# Patient Record
Sex: MALE | Race: WHITE | NOT HISPANIC OR LATINO | Employment: OTHER | URBAN - METROPOLITAN AREA
[De-identification: names, ages, dates, MRNs, and addresses within clinical notes are randomized per-mention and may not be internally consistent; named-entity substitution may affect disease eponyms.]

---

## 2017-03-12 ENCOUNTER — HOSPITAL ENCOUNTER (EMERGENCY)
Facility: OTHER | Age: 62
Discharge: ANOTHER HOSPITAL | End: 2017-03-12
Attending: EMERGENCY MEDICINE
Payer: MEDICARE

## 2017-03-12 ENCOUNTER — HOSPITAL ENCOUNTER (INPATIENT)
Facility: HOSPITAL | Age: 62
LOS: 5 days | Discharge: HOME | DRG: 871 | End: 2017-03-17
Attending: HOSPITALIST
Payer: MEDICARE

## 2017-03-12 VITALS
WEIGHT: 145 LBS | RESPIRATION RATE: 18 BRPM | TEMPERATURE: 98 F | SYSTOLIC BLOOD PRESSURE: 150 MMHG | HEIGHT: 65 IN | DIASTOLIC BLOOD PRESSURE: 79 MMHG | OXYGEN SATURATION: 92 % | HEART RATE: 86 BPM | BODY MASS INDEX: 24.16 KG/M2

## 2017-03-12 DIAGNOSIS — A41.9 SEPSIS, DUE TO UNSPECIFIED ORGANISM: ICD-10-CM

## 2017-03-12 DIAGNOSIS — R09.02 HYPOXIA: ICD-10-CM

## 2017-03-12 DIAGNOSIS — J18.9 PNEUMONIA OF RIGHT LOWER LOBE DUE TO INFECTIOUS ORGANISM: Primary | ICD-10-CM

## 2017-03-12 DIAGNOSIS — J15.9 HEALTHCARE ASSOCIATED BACTERIAL PNEUMONIA: ICD-10-CM

## 2017-03-12 PROBLEM — M54.9 CHRONIC BACK PAIN: Chronic | Status: ACTIVE | Noted: 2017-03-12

## 2017-03-12 PROBLEM — E78.5 HYPERLIPIDEMIA: Chronic | Status: ACTIVE | Noted: 2017-03-12

## 2017-03-12 PROBLEM — G89.29 CHRONIC BACK PAIN: Chronic | Status: ACTIVE | Noted: 2017-03-12

## 2017-03-12 PROBLEM — Z72.0 TOBACCO ABUSE: Chronic | Status: ACTIVE | Noted: 2017-03-12

## 2017-03-12 PROBLEM — I10 ESSENTIAL HYPERTENSION: Chronic | Status: ACTIVE | Noted: 2017-03-12

## 2017-03-12 LAB
ALBUMIN SERPL-MCNC: 4 G/DL (ref 3.3–5.5)
ALLENS TEST: ABNORMAL
ALP SERPL-CCNC: 71 U/L (ref 42–141)
BILIRUB SERPL-MCNC: 0.7 MG/DL (ref 0.2–1.6)
BUN SERPL-MCNC: 9 MG/DL (ref 7–22)
CALCIUM SERPL-MCNC: 9.3 MG/DL (ref 8–10.3)
CHLORIDE SERPL-SCNC: 94 MMOL/L (ref 98–108)
CREAT SERPL-MCNC: 0.9 MG/DL (ref 0.6–1.2)
DELSYS: ABNORMAL
GLUCOSE SERPL-MCNC: 127 MG/DL (ref 73–118)
HCO3 UR-SCNC: 23.5 MMOL/L (ref 24–28)
LDH SERPL L TO P-CCNC: 0.8 MMOL/L (ref 0.36–1.25)
PCO2 BLDA: 31.1 MMHG (ref 35–45)
PH SMN: 7.49 [PH] (ref 7.35–7.45)
PO2 BLDA: 46 MMHG (ref 80–100)
POC ALT (SGPT): 18 (ref 10–47)
POC AST (SGOT): 30 (ref 11–38)
POC B-TYPE NATRIURETIC PEPTIDE: 43.7 PG/ML (ref 0–100)
POC BE: 0 MMOL/L
POC CARDIAC TROPONIN I: 0 NG/ML
POC SATURATED O2: 86 % (ref 95–100)
POC TCO2: 24 MMOL/L (ref 23–27)
POC TCO2: 28 (ref 18–33)
POTASSIUM BLD-SCNC: 4 MMOL/L (ref 3.6–5.1)
PROTEIN, POC: 7.2 (ref 6.4–8.1)
SAMPLE: ABNORMAL
SAMPLE: NORMAL
SITE: ABNORMAL
SODIUM BLD-SCNC: 136 MMOL/L (ref 128–145)

## 2017-03-12 PROCEDURE — 84484 ASSAY OF TROPONIN QUANT: CPT

## 2017-03-12 PROCEDURE — 87088 URINE BACTERIA CULTURE: CPT

## 2017-03-12 PROCEDURE — 87077 CULTURE AEROBIC IDENTIFY: CPT

## 2017-03-12 PROCEDURE — 96366 THER/PROPH/DIAG IV INF ADDON: CPT

## 2017-03-12 PROCEDURE — 25000003 PHARM REV CODE 250: Performed by: INTERNAL MEDICINE

## 2017-03-12 PROCEDURE — 11000001 HC ACUTE MED/SURG PRIVATE ROOM

## 2017-03-12 PROCEDURE — 99291 CRITICAL CARE FIRST HOUR: CPT | Mod: 25

## 2017-03-12 PROCEDURE — 25000003 PHARM REV CODE 250: Performed by: EMERGENCY MEDICINE

## 2017-03-12 PROCEDURE — 87205 SMEAR GRAM STAIN: CPT

## 2017-03-12 PROCEDURE — 96367 TX/PROPH/DG ADDL SEQ IV INF: CPT

## 2017-03-12 PROCEDURE — 80053 COMPREHEN METABOLIC PANEL: CPT

## 2017-03-12 PROCEDURE — 87186 SC STD MICRODIL/AGAR DIL: CPT

## 2017-03-12 PROCEDURE — 81003 URINALYSIS AUTO W/O SCOPE: CPT

## 2017-03-12 PROCEDURE — 93010 ELECTROCARDIOGRAM REPORT: CPT | Mod: ,,, | Performed by: INTERNAL MEDICINE

## 2017-03-12 PROCEDURE — 85025 COMPLETE CBC W/AUTO DIFF WBC: CPT

## 2017-03-12 PROCEDURE — 87040 BLOOD CULTURE FOR BACTERIA: CPT

## 2017-03-12 PROCEDURE — 93005 ELECTROCARDIOGRAM TRACING: CPT

## 2017-03-12 PROCEDURE — 87070 CULTURE OTHR SPECIMN AEROBIC: CPT

## 2017-03-12 PROCEDURE — 96365 THER/PROPH/DIAG IV INF INIT: CPT

## 2017-03-12 PROCEDURE — 96361 HYDRATE IV INFUSION ADD-ON: CPT

## 2017-03-12 PROCEDURE — 83880 ASSAY OF NATRIURETIC PEPTIDE: CPT

## 2017-03-12 PROCEDURE — 63600175 PHARM REV CODE 636 W HCPCS: Performed by: EMERGENCY MEDICINE

## 2017-03-12 PROCEDURE — 85610 PROTHROMBIN TIME: CPT

## 2017-03-12 PROCEDURE — 87086 URINE CULTURE/COLONY COUNT: CPT

## 2017-03-12 RX ORDER — RAMELTEON 8 MG/1
8 TABLET ORAL NIGHTLY PRN
Status: DISCONTINUED | OUTPATIENT
Start: 2017-03-12 | End: 2017-03-17 | Stop reason: HOSPADM

## 2017-03-12 RX ORDER — ACETAMINOPHEN 500 MG
1000 TABLET ORAL
Status: COMPLETED | OUTPATIENT
Start: 2017-03-12 | End: 2017-03-12

## 2017-03-12 RX ORDER — ALLOPURINOL 100 MG/1
300 TABLET ORAL DAILY
Status: DISCONTINUED | OUTPATIENT
Start: 2017-03-13 | End: 2017-03-17 | Stop reason: HOSPADM

## 2017-03-12 RX ORDER — ACETAMINOPHEN 500 MG
500 TABLET ORAL EVERY 6 HOURS PRN
Status: DISCONTINUED | OUTPATIENT
Start: 2017-03-12 | End: 2017-03-17 | Stop reason: HOSPADM

## 2017-03-12 RX ORDER — SODIUM CHLORIDE 9 MG/ML
1000 INJECTION, SOLUTION INTRAVENOUS
Status: COMPLETED | OUTPATIENT
Start: 2017-03-12 | End: 2017-03-12

## 2017-03-12 RX ORDER — IPRATROPIUM BROMIDE AND ALBUTEROL SULFATE 2.5; .5 MG/3ML; MG/3ML
3 SOLUTION RESPIRATORY (INHALATION) EVERY 6 HOURS PRN
Status: DISCONTINUED | OUTPATIENT
Start: 2017-03-12 | End: 2017-03-17 | Stop reason: HOSPADM

## 2017-03-12 RX ORDER — ENOXAPARIN SODIUM 100 MG/ML
40 INJECTION SUBCUTANEOUS EVERY 24 HOURS
Status: DISCONTINUED | OUTPATIENT
Start: 2017-03-13 | End: 2017-03-17 | Stop reason: HOSPADM

## 2017-03-12 RX ORDER — SODIUM CHLORIDE 9 MG/ML
INJECTION, SOLUTION INTRAVENOUS CONTINUOUS
Status: ACTIVE | OUTPATIENT
Start: 2017-03-12 | End: 2017-03-13

## 2017-03-12 RX ORDER — CIPROFLOXACIN 2 MG/ML
400 INJECTION, SOLUTION INTRAVENOUS
Status: COMPLETED | OUTPATIENT
Start: 2017-03-12 | End: 2017-03-12

## 2017-03-12 RX ORDER — SULFAMETHOXAZOLE AND TRIMETHOPRIM 400; 80 MG/1; MG/1
1 TABLET ORAL 2 TIMES DAILY
Status: ON HOLD | COMMUNITY
End: 2017-03-15 | Stop reason: HOSPADM

## 2017-03-12 RX ORDER — IBUPROFEN 400 MG/1
800 TABLET ORAL
Status: COMPLETED | OUTPATIENT
Start: 2017-03-12 | End: 2017-03-12

## 2017-03-12 RX ORDER — IBUPROFEN 200 MG
1 TABLET ORAL DAILY
Status: DISCONTINUED | OUTPATIENT
Start: 2017-03-13 | End: 2017-03-17 | Stop reason: HOSPADM

## 2017-03-12 RX ORDER — ATORVASTATIN CALCIUM 10 MG/1
20 TABLET, FILM COATED ORAL DAILY
Status: DISCONTINUED | OUTPATIENT
Start: 2017-03-13 | End: 2017-03-17 | Stop reason: HOSPADM

## 2017-03-12 RX ORDER — CIPROFLOXACIN 2 MG/ML
400 INJECTION, SOLUTION INTRAVENOUS
Status: DISCONTINUED | OUTPATIENT
Start: 2017-03-12 | End: 2017-03-12

## 2017-03-12 RX ORDER — CEFEPIME HYDROCHLORIDE 1 G/50ML
1 INJECTION, SOLUTION INTRAVENOUS
Status: DISCONTINUED | OUTPATIENT
Start: 2017-03-12 | End: 2017-03-12

## 2017-03-12 RX ORDER — CLONIDINE HYDROCHLORIDE 0.1 MG/1
0.1 TABLET ORAL 3 TIMES DAILY PRN
Status: DISCONTINUED | OUTPATIENT
Start: 2017-03-12 | End: 2017-03-17 | Stop reason: HOSPADM

## 2017-03-12 RX ORDER — ONDANSETRON 2 MG/ML
8 INJECTION INTRAMUSCULAR; INTRAVENOUS EVERY 8 HOURS PRN
Status: DISCONTINUED | OUTPATIENT
Start: 2017-03-12 | End: 2017-03-17 | Stop reason: HOSPADM

## 2017-03-12 RX ADMIN — SODIUM CHLORIDE 1000 ML: 0.9 INJECTION, SOLUTION INTRAVENOUS at 07:03

## 2017-03-12 RX ADMIN — SODIUM CHLORIDE: 0.9 INJECTION, SOLUTION INTRAVENOUS at 11:03

## 2017-03-12 RX ADMIN — VANCOMYCIN HYDROCHLORIDE 1 G: 1 INJECTION, POWDER, LYOPHILIZED, FOR SOLUTION INTRAVENOUS at 08:03

## 2017-03-12 RX ADMIN — IBUPROFEN 800 MG: 400 TABLET ORAL at 06:03

## 2017-03-12 RX ADMIN — CIPROFLOXACIN 400 MG: 2 INJECTION, SOLUTION INTRAVENOUS at 06:03

## 2017-03-12 RX ADMIN — SODIUM CHLORIDE, SODIUM LACTATE, POTASSIUM CHLORIDE, AND CALCIUM CHLORIDE 1974 ML: .6; .31; .03; .02 INJECTION, SOLUTION INTRAVENOUS at 04:03

## 2017-03-12 RX ADMIN — ACETAMINOPHEN 1000 MG: 500 TABLET ORAL at 05:03

## 2017-03-12 NOTE — ED NOTES
Patient identifiers verified and correct for Dereck Soria.    LOC: The patient is awake, alert and aware of environment with an appropriate affect, the patient is oriented x 3 and speaking appropriately.  APPEARANCE: Patient resting comfortably and in no acute distress, patient is clean and well groomed, patient's clothing is properly fastened.  SKIN: The skin is warm and dry, color consistent with ethnicity, patient has normal skin turgor and moist mucus membranes, skin intact, recent surgical incision to r groin, no s&s of infection.  MUSCULOSKELETAL: Patient moving all extremities spontaneously, no obvious swelling or deformities noted.  RESPIRATORY: Airway is open and patent, respirations are spontaneous, patient has a normal effort and rate, no accessory muscle use noted, coarse bilateral breath sounds.  CARDIAC: Patient sinus tach, no periphreal edema noted, capillary refill < 3 seconds.  ABDOMEN: Soft and non tender to palpation, no distention noted, normoactive bowel sounds present in all four quadrants.  NEUROLOGIC: PERRL, 3mm bilaterally, eyes open spontaneously, behavior appropriate to situation, follows commands, facial expression symmetrical, bilateral hand grasp equal and even, purposeful motor response noted, normal sensation in all extremities when touched with a finger.

## 2017-03-12 NOTE — ED TRIAGE NOTES
"Onset severa ldays with congestion and back pain / pt reports slight SOB but currently Sat low / pt states while laying flat "It's like I couldn't get any oxygen"   "

## 2017-03-12 NOTE — IP AVS SNAPSHOT
[place] [address] [place] [address]  Phone: [phone]           Patient Discharge Instructions     Our goal is to set you up for success. This packet includes information on your condition, medications, and your home care. It will help you to care for yourself so you don't get sicker and need to go back to the hospital.     Please ask your nurse if you have any questions.        There are many details to remember when preparing to leave the hospital. Here is what you will need to do:    1. Take your medicine. If you are prescribed medications, review your Medication List in the following pages. You may have new medications to  at the pharmacy and others that you'll need to stop taking. Review the instructions for how and when to take your medications. Talk with your doctor or nurses if you are unsure of what to do.     2. Go to your follow-up appointments. Specific follow-up information is listed in the following pages. Your may be contacted by a transition nurse or clinical provider about future appointments. Be sure we have all of the phone numbers to reach you, if needed. Please contact your provider's office if you are unable to make an appointment.     3. Watch for warning signs. Your doctor or nurse will give you detailed warning signs to watch for and when to call for assistance. These instructions may also include educational information about your condition. If you experience any of warning signs to your health, call your doctor.               [facility]  Unless otherwise directed by your provider, please contact [facility], our nurse care line that is available for 24/7 assistance.     [phone] (toll-free)    Registered nurses in the [facility] provide clinical advisement, health education, appointment booking, and other advisory services.                    ** Verify the list of medication(s) below is accurate and up to date.  Carry this with you in case of emergency. If your medications have changed, please notify your healthcare provider.             Medication List      START taking these medications        Additional Info                      amlodipine 10 MG tablet   Commonly known as:  NORVASC   Quantity:  30 tablet   Refills:  4   Dose:  10 mg    Last time this was given:  10 mg on 3/14/2017  8:27 AM   Instructions:  Take 1 tablet (10 mg total) by mouth once daily.     Begin Date    AM    Noon    PM    Bedtime       moxifloxacin 400 mg tablet   Commonly known as:  AVELOX ABC PACK   Quantity:  7 tablet   Refills:  0   Dose:  400 mg    Instructions:  Take 1 tablet (400 mg total) by mouth once daily.     Begin Date    AM    Noon    PM    Bedtime       oxycodone-acetaminophen 7.5-325 mg per tablet   Commonly known as:  PERCOCET   Quantity:  30 tablet   Refills:  0   Dose:  1 tablet   Replaces:  oxycodone-acetaminophen 5-325 mg per tablet    Last time this was given:  1 tablet on 3/14/2017  9:02 AM   Instructions:  Take 1 tablet by mouth every 4 (four) hours as needed.     Begin Date    AM    Noon    PM    Bedtime         CONTINUE taking these medications        Additional Info                      acetaminophen 500 MG tablet   Commonly known as:  TYLENOL   Refills:  0   Dose:  500 mg    Last time this was given:  500 mg on 3/13/2017  8:59 PM   Instructions:  Take 500 mg by mouth every 6 (six) hours as needed.     Begin Date    AM    Noon    PM    Bedtime       allopurinol 300 MG tablet   Commonly known as:  ZYLOPRIM   Refills:  0   Dose:  300 mg    Last time this was given:  300 mg on 3/14/2017  8:27 AM   Instructions:  Take 300 mg by mouth once daily.     Begin Date    AM    Noon    PM    Bedtime       amitriptyline 75 MG tablet   Commonly known as:  ELAVIL   Refills:  0   Dose:  75 mg   Indications:  pt takes 125 mg    Instructions:  Take 75 mg by mouth every evening.     Begin Date    AM    Noon    PM    Bedtime       atorvastatin  20 MG tablet   Commonly known as:  LIPITOR   Refills:  0   Dose:  20 mg    Last time this was given:  20 mg on 3/14/2017  8:27 AM   Instructions:  Take 20 mg by mouth once daily.     Begin Date    AM    Noon    PM    Bedtime       baclofen 10 MG tablet   Commonly known as:  LIORESAL   Refills:  0   Dose:  10 mg    Instructions:  Take 10 mg by mouth 4 (four) times daily.     Begin Date    AM    Noon    PM    Bedtime       dantrolene 25 MG Cap   Commonly known as:  DANTRIUM   Refills:  0   Dose:  100 mg    Instructions:  Take 100 mg by mouth 3 (three) times daily.     Begin Date    AM    Noon    PM    Bedtime       ibuprofen 200 MG tablet   Commonly known as:  ADVIL,MOTRIN   Refills:  0   Dose:  200 mg    Instructions:  Take 200 mg by mouth every 6 (six) hours as needed.     Begin Date    AM    Noon    PM    Bedtime       multivitamin capsule   Refills:  0   Dose:  1 capsule    Instructions:  Take 1 capsule by mouth once daily.     Begin Date    AM    Noon    PM    Bedtime       tizanidine 6 mg capsule   Commonly known as:  ZANAFLEX   Refills:  0   Dose:  4 mg    Instructions:  Take 4 mg by mouth 3 (three) times daily.     Begin Date    AM    Noon    PM    Bedtime         STOP taking these medications     hydrocodone-acetaminophen 5-325mg 5-325 mg per tablet   Commonly known as:  NORCO       oxycodone-acetaminophen 5-325 mg per tablet   Commonly known as:  PERCOCET   Replaced by:  oxycodone-acetaminophen 7.5-325 mg per tablet       sulfamethoxazole-trimethoprim 400-80mg 400-80 mg per tablet   Commonly known as:  BACTRIM,SEPTRA            Where to Get Your Medications      You can get these medications from any pharmacy     Bring a paper prescription for each of these medications     amlodipine 10 MG tablet    moxifloxacin 400 mg tablet    oxycodone-acetaminophen 7.5-325 mg per tablet                  Please bring to all follow up appointments:    1. A copy of your discharge instructions.  2. All medicines you are  "currently taking in their original bottles.  3. Identification and insurance card.    Please arrive 15 minutes ahead of scheduled appointment time.    Please call 24 hours in advance if you must reschedule your appointment and/or time.        Follow-up Information     Follow up with Dusty Chan MD In 1 week.    Specialty:  General Practice    Contact information:    [address] [place] [address]   [address]  [place] [address]  [phone]          Discharge Instructions     Future Orders    Activity as tolerated     Diet general     Questions:    Total calories:      Fat restriction, if any:      Protein restriction, if any:      Na restriction, if any:  2gNa    Fluid restriction:      Additional restrictions:      OXYGEN FOR HOME USE     Questions:    Liter Flow:  2    Duration:  Continuous    Qualifying SpO2:  88% on RA    Testing done at:  [facility]    Route:      Portable mode:      Device:  home concentrator with portable unit    Length of need (in months):      Patient condition with qualifying saturation:      Height:  5' 6" (1.676 m)    Weight:  66 kg (145 lb 7.4 oz)    Does patient have medical equipment at home?:  bath bench    walker, rolling    Alternative treatment measures have been tried or considered and deemed clinically ineffective.:  Yes        Primary Diagnosis     Your primary diagnosis was:  Lung Infection      Admission Information     Date & Time Provider Department CSN    3/12/2017 10:58 PM Garo Oviedo MD [facility] [id number]      Care Providers     Provider Role Specialty Primary office phone    Garo Oviedo MD Attending Provider Hospitalist [phone]      Your Vitals Were     BP Pulse Temp Resp Height Weight    141/69 93 98.3 °F (36.8 °C) (Oral) 18 5' 6" (1.676 m) 66 kg (145 lb 7.4 oz)    SpO2 BMI             96% 23.48 kg/m2         Recent Lab Values     No lab values to display.      Allergies as of 3/15/2017        Reactions    Bee Sting [Allergen " Ext-venom-honey Bee]       Advance Directives     An advance directive is a document which, in the event you are no longer able to make decisions for yourself, tells your healthcare team what kind of treatment you do or do not want to receive, or who you would like to make those decisions for you.  If you do not currently have an advance directive, [facility] encourages you to create one.  For more information call:  [phone]), [phone] ([phone]),  or log on to [email or web address].        Language Assistance Services     ATTENTION: Language assistance services are available, free of charge. Please call [phone].      ATENCIÓN: Si adeline lucas, tiene a whipple disposición servicios gratuitos de asistencia lingüística. Llame al [phone].     CHÚ Ý: N?u b?n nói Ti?ng Vi?t, có các d?ch v? h? tr? ngôn ng? mi?n phí dành cho b?n. G?i s? [phone].        Pneumonmia Discharge Instructions                [facility] Sign-Up     Activating your [facility] account is as easy as 1-2-3!     1) Visit [email or web address], select Sign Up Now, enter this activation code and your date of birth, then select Next.  [email or web address]  Expires: 4/29/2017  8:51 AM      2) Create a username and password to use when you visit [facility] in the future and select a security question in case you lose your password and select Next.    3) Enter your e-mail address and click Sign Up!    Additional Information  If you have questions, please e-mail [email or web address] or call [phone] to talk to our [facility] staff. Remember, [facility] is NOT to be used for urgent needs. For medical emergencies, dial 911.          [facility] complies with applicable Federal civil rights laws and does not discriminate on the basis of race, color, national origin, age, disability, or sex.

## 2017-03-12 NOTE — ED PROVIDER NOTES
Encounter Date: 3/12/2017       History     Chief Complaint   Patient presents with    Back Pain     fever / congestion / cough / + smoker     Review of patient's allergies indicates:   Allergen Reactions    Bee sting [allergen ext-venom-honey bee]      HPI Comments: Dereck Soria is a 61 y.o. male who presents to the Emergency Department with  fever, congestion, cough.  Patient's been having fevers at home.  He has not checked his temperature.  Patient has been having a cough for quite a few days however the fever is over the last 2 days.  Patient reports a history of smoking.  Patient states he also had hernia surgery the beginning of February.  Patient denies abdominal pain, nausea, vomiting.    Patient is a 61 y.o. male presenting with the following complaint: cough. The history is provided by the patient and the spouse.   Cough   This is a new problem. The current episode started several days ago. The cough is productive of sputum. Maximum temperature: Patient has been having fever and chills, but did not take temperature. The fever has been present for 1 to 2 days. Associated symptoms include chills, sweats, rhinorrhea, myalgias, shortness of breath and wheezing. He has tried nothing for the symptoms. He is a smoker. His past medical history does not include COPD or asthma.     Past Medical History:   Diagnosis Date    Back problem     Hyperlipidemia     Hypertension      Past Surgical History:   Procedure Laterality Date    BACK SURGERY      CARPAL TUNNEL RELEASE Left     CERVICAL FUSION      x2    KNEE SURGERY  left tibial osteotomy as child    SPINAL FUSION       Family History   Problem Relation Age of Onset    Diabetes Mother      Social History   Substance Use Topics    Smoking status: Current Every Day Smoker    Smokeless tobacco: None    Alcohol use 4.2 oz/week     7 Shots of liquor per week     Review of Systems   Constitutional: Positive for chills.   HENT: Positive for  rhinorrhea.    Respiratory: Positive for cough, shortness of breath and wheezing.    Gastrointestinal: Negative for abdominal pain, nausea and vomiting.   Musculoskeletal: Positive for myalgias.   All other systems reviewed and are negative.      Physical Exam   Initial Vitals   BP Pulse Resp Temp SpO2   03/12/17 1613 03/12/17 1613 03/12/17 1613 03/12/17 1613 03/12/17 1613   154/76 125 22 100.3 °F (37.9 °C) 88 %     Physical Exam    Nursing note and vitals reviewed.  Constitutional: He appears well-developed and well-nourished. He is diaphoretic. He appears distressed.   HENT:   Head: Normocephalic and atraumatic.   Right Ear: External ear normal.   Left Ear: External ear normal.   Nose: Nose normal.   Mouth/Throat: Oropharynx is clear and moist.   Eyes: EOM are normal. Pupils are equal, round, and reactive to light.   Neck: Normal range of motion. Neck supple. No tracheal deviation present. No JVD present.   Cardiovascular: Regular rhythm, S1 normal, S2 normal, normal heart sounds and intact distal pulses. Tachycardia present.  Exam reveals no gallop and no friction rub.    No murmur heard.  Pulmonary/Chest: Tachypnea noted. He is in respiratory distress. He has decreased breath sounds in the right lower field and the left lower field. He has rhonchi in the right lower field. He has no rales. He exhibits no tenderness.   Abdominal: Soft. Bowel sounds are normal. He exhibits no distension. There is no tenderness. There is no rebound and no guarding.   Musculoskeletal: Normal range of motion. He exhibits no edema or tenderness.   Neurological: He is alert and oriented to person, place, and time. He has normal strength and normal reflexes. He displays normal reflexes. No cranial nerve deficit or sensory deficit.   Skin: Skin is warm.   Psychiatric: He has a normal mood and affect.         ED Course   Critical Care  Date/Time: 3/12/2017 6:05 PM  Performed by: MICHAEL ANGELES  Authorized by: MICHAEL ANGELES   Direct patient  critical care time: 12 minutes  Additional history critical care time: 10 minutes  Ordering / reviewing critical care time: 15 minutes  Documentation critical care time: 11 minutes  Consulting other physicians critical care time: 10 minutes  Consult with family critical care time: 8 minutes  Total critical care time (exclusive of procedural time) : 66 minutes  Critical care was time spent personally by me on the following activities: pulse oximetry, re-evaluation of patient's condition, ordering and review of radiographic studies, ordering and review of laboratory studies, examination of patient and evaluation of patient's response to treatment.        Labs Reviewed   ISTAT PROCEDURE - Abnormal; Notable for the following:        Result Value    POC PH 7.486 (*)     POC PCO2 31.1 (*)     POC PO2 46 (*)     POC HCO3 23.5 (*)     POC SATURATED O2 86 (*)     All other components within normal limits   CULTURE, BLOOD    Narrative:     Aerobic and anaerobic   CULTURE, BLOOD    Narrative:     Aerobic and anaerobic   CULTURE, RESPIRATORY   TROPONIN ISTAT   POCT CBC   POCT URINALYSIS DIPSTICK (CULTURE IF INDICATED)   POCT CMP   POCT PROTIME-INR   POCT TROPONIN   POCT B-TYPE NATRIURETIC PEPTIDE (BNP)   POCT B-TYPE NATRIURETIC PEPTIDE (BNP)   POCT CMP     EKG Readings: (Independently Interpreted)   Initial Reading: No STEMI. Rhythm: Sinus Tachycardia. Heart Rate: 110. Conduction: RBBB. Axis: Right Axis Deviation. Clinical Impression: Sinus Tachycardia Other Impression: Abnormal EKG right axis.       Troponin is negative at 0.00  ABG with low PO2 of 46.  CMP sodium 136, potassium 4.0, glucose 127, BUN 9, creatinine 0.9.  CBC White blood cell count 11.3, hemoglobin 12.8, hematocrit 37.1, and platelets 138.  BNP 43.7.    Admission on 03/12/2017   Component Date Value Ref Range Status    POC PH 03/12/2017 7.486* 7.35 - 7.45 Final    POC PCO2 03/12/2017 31.1* 35 - 45 mmHg Final    POC PO2 03/12/2017 46* 80 - 100 mmHg Final     POC HCO3 03/12/2017 23.5* 24 - 28 mmol/L Final    POC BE 03/12/2017 0  -2 to 2 mmol/L Final    POC SATURATED O2 03/12/2017 86* 95 - 100 % Final    POC Lactate 03/12/2017 0.80  0.36 - 1.25 mmol/L Final    POC TCO2 03/12/2017 24  23 - 27 mmol/L Final    Sample 03/12/2017 ARTERIAL   Final    Site 03/12/2017 RR   Final    Allens Test 03/12/2017 Pass   Final    DelSys 03/12/2017 Room Air   Final    POC B-Type Natriuretic Peptide 03/12/2017 43.7  0.0 - 100 pg/mL Final    Albumin, POC 03/12/2017 4.0  3.3 - 5.5 Final    Alkaline Phosphatase, POC 03/12/2017 71  42 - 141 Final    ALT (SGPT), POC 03/12/2017 18  10.0 - 47.0 Final    AST (SGOT), POC 03/12/2017 30  11.0 - 38 Final    POC BUN 03/12/2017 9  7.0 - 22.0 Final    Calcium, POC 03/12/2017 9.3  8.0 - 10.3 Final    POC Chloride 03/12/2017 94  98 - 108 Final    POC Creatinine 03/12/2017 0.9  0.6 - 1.2 Final    POC Glucose 03/12/2017 127  73 - 118 Final    POC Potassium 03/12/2017 4.0  3.6 - 5.1 Final    POC Sodium 03/12/2017 136  128 - 145 Final    Bilirubin 03/12/2017 0.7  0.2 - 1.6 Final    POC TCO2 03/12/2017 28  18 - 33 Final    Protein 03/12/2017 7.2  6.4 - 8.1 Final    POC Cardiac Troponin I 03/12/2017 0.00  <0.09 ng/mL Final    Sample 03/12/2017 UNK   Final       Imaging Results         X-Ray Chest AP Portable (Final result) Result time:  03/12/17 16:48:15    Final result by Interface, Rad Results In (03/12/17 16:48:15)    Narrative:    Study Desc:   XR CHEST AP PORTABLE  Clinical History: Sepsis with hypoxia     Comparison: None     Technique: Single frontal view of the chest was obtained.     Findings:  Heart: The cardiac silhouette is normal in size.  The aorta is unremarkable.  The   pulmonary vasculature is normal in caliber.     Lungs: Lung volumes are maintained.  The left lung is clear.  There are no effusions or   pneumothoraces.  There is hazy opacity in the right lung base.     Bones: The visualized osseous structures are  unremarkable.     Impression:  Right lower lobe infiltrate.     SL: 24 Signed by: Noa Davalos MD  2017-03-12 16:48:14                                   ED Course       CC productive cough  DDx influenza, sepsis, viral illness, bronchitis, and pneumonia  Treatment in the ED Tylenol for fever, IV fluids based on sepsis protocol IV antibiotics.  Oxygen to treat hypoxia  Contacted transfer center at 6 PM spoke with Jeff regarding admission at off [facility].  Consultation with Dr Jacob for admission.  Discussed patient's presentation, past medical history, physical exam, and ED course.  Also discussed vital signs and diagnosis is.  At this time patient will be admitted to Dr Giovanni Ko inpatient for hCAP.  Patient is agreeable to admission.      Clinical Impression:   The primary encounter diagnosis was Pneumonia of right lower lobe due to infectious organism. Diagnoses of Sepsis, due to unspecified organism and Hypoxia were also pertinent to this visit.          Sally Green DO  03/12/17 1918       Sally Green DO  03/12/17 1942

## 2017-03-13 LAB
ALBUMIN SERPL BCP-MCNC: 3.2 G/DL
ALP SERPL-CCNC: 80 U/L
ALT SERPL W/O P-5'-P-CCNC: 10 U/L
ANION GAP SERPL CALC-SCNC: 8 MMOL/L
AST SERPL-CCNC: 11 U/L
BASOPHILS # BLD AUTO: 0.02 K/UL
BASOPHILS NFR BLD: 0.2 %
BILIRUB SERPL-MCNC: 0.7 MG/DL
BUN SERPL-MCNC: 9 MG/DL
CALCIUM SERPL-MCNC: 8.8 MG/DL
CHLORIDE SERPL-SCNC: 103 MMOL/L
CO2 SERPL-SCNC: 26 MMOL/L
CREAT SERPL-MCNC: 0.8 MG/DL
DIFFERENTIAL METHOD: ABNORMAL
EOSINOPHIL # BLD AUTO: 0 K/UL
EOSINOPHIL NFR BLD: 0.3 %
ERYTHROCYTE [DISTWIDTH] IN BLOOD BY AUTOMATED COUNT: 12.6 %
EST. GFR  (AFRICAN AMERICAN): >60 ML/MIN/1.73 M^2
EST. GFR  (NON AFRICAN AMERICAN): >60 ML/MIN/1.73 M^2
GLUCOSE SERPL-MCNC: 130 MG/DL
HCT VFR BLD AUTO: 33 %
HGB BLD-MCNC: 11.2 G/DL
LYMPHOCYTES # BLD AUTO: 1 K/UL
LYMPHOCYTES NFR BLD: 9.6 %
MAGNESIUM SERPL-MCNC: 1.8 MG/DL
MCH RBC QN AUTO: 36.4 PG
MCHC RBC AUTO-ENTMCNC: 33.9 %
MCV RBC AUTO: 107 FL
MONOCYTES # BLD AUTO: 1.1 K/UL
MONOCYTES NFR BLD: 10.6 %
NEUTROPHILS # BLD AUTO: 8.1 K/UL
NEUTROPHILS NFR BLD: 79 %
PHOSPHATE SERPL-MCNC: 2.7 MG/DL
PLATELET # BLD AUTO: 135 K/UL
PMV BLD AUTO: 11 FL
POTASSIUM SERPL-SCNC: 3.5 MMOL/L
PROT SERPL-MCNC: 6.2 G/DL
RBC # BLD AUTO: 3.08 M/UL
SODIUM SERPL-SCNC: 137 MMOL/L
VANCOMYCIN TROUGH SERPL-MCNC: 11.6 UG/ML
WBC # BLD AUTO: 10.21 K/UL

## 2017-03-13 PROCEDURE — 36415 COLL VENOUS BLD VENIPUNCTURE: CPT

## 2017-03-13 PROCEDURE — 83735 ASSAY OF MAGNESIUM: CPT

## 2017-03-13 PROCEDURE — 80202 ASSAY OF VANCOMYCIN: CPT

## 2017-03-13 PROCEDURE — 25000003 PHARM REV CODE 250: Performed by: INTERNAL MEDICINE

## 2017-03-13 PROCEDURE — 63600175 PHARM REV CODE 636 W HCPCS: Performed by: INTERNAL MEDICINE

## 2017-03-13 PROCEDURE — 80053 COMPREHEN METABOLIC PANEL: CPT

## 2017-03-13 PROCEDURE — 11000001 HC ACUTE MED/SURG PRIVATE ROOM

## 2017-03-13 PROCEDURE — 84100 ASSAY OF PHOSPHORUS: CPT

## 2017-03-13 PROCEDURE — 85025 COMPLETE CBC W/AUTO DIFF WBC: CPT

## 2017-03-13 RX ORDER — AMLODIPINE BESYLATE 5 MG/1
10 TABLET ORAL DAILY
Status: DISCONTINUED | OUTPATIENT
Start: 2017-03-13 | End: 2017-03-17 | Stop reason: HOSPADM

## 2017-03-13 RX ORDER — VALSARTAN 80 MG/1
320 TABLET ORAL DAILY
Status: DISCONTINUED | OUTPATIENT
Start: 2017-03-14 | End: 2017-03-17 | Stop reason: HOSPADM

## 2017-03-13 RX ORDER — KETOROLAC TROMETHAMINE 30 MG/ML
30 INJECTION, SOLUTION INTRAMUSCULAR; INTRAVENOUS ONCE
Status: COMPLETED | OUTPATIENT
Start: 2017-03-13 | End: 2017-03-13

## 2017-03-13 RX ORDER — MORPHINE SULFATE 10 MG/ML
2 INJECTION INTRAMUSCULAR; INTRAVENOUS; SUBCUTANEOUS ONCE
Status: COMPLETED | OUTPATIENT
Start: 2017-03-13 | End: 2017-03-13

## 2017-03-13 RX ADMIN — NICOTINE 1 PATCH: 21 PATCH, EXTENDED RELEASE TRANSDERMAL at 08:03

## 2017-03-13 RX ADMIN — KETOROLAC TROMETHAMINE 30 MG: 30 INJECTION, SOLUTION INTRAMUSCULAR at 09:03

## 2017-03-13 RX ADMIN — ENOXAPARIN SODIUM 40 MG: 100 INJECTION SUBCUTANEOUS at 12:03

## 2017-03-13 RX ADMIN — ACETAMINOPHEN 500 MG: 500 TABLET ORAL at 08:03

## 2017-03-13 RX ADMIN — CLONIDINE HYDROCHLORIDE 0.1 MG: 0.1 TABLET ORAL at 04:03

## 2017-03-13 RX ADMIN — MORPHINE SULFATE 2 MG: 10 INJECTION INTRAVENOUS at 09:03

## 2017-03-13 RX ADMIN — AMLODIPINE BESYLATE 10 MG: 5 TABLET ORAL at 09:03

## 2017-03-13 RX ADMIN — ATORVASTATIN CALCIUM 20 MG: 10 TABLET, FILM COATED ORAL at 08:03

## 2017-03-13 RX ADMIN — TIZANIDINE 6 MG: 2 TABLET ORAL at 08:03

## 2017-03-13 RX ADMIN — AZITHROMYCIN MONOHYDRATE 500 MG: 500 INJECTION, POWDER, LYOPHILIZED, FOR SOLUTION INTRAVENOUS at 08:03

## 2017-03-13 RX ADMIN — CEFTRIAXONE 1 G: 1 INJECTION, SOLUTION INTRAVENOUS at 08:03

## 2017-03-13 RX ADMIN — ACETAMINOPHEN 500 MG: 500 TABLET ORAL at 04:03

## 2017-03-13 RX ADMIN — SODIUM CHLORIDE: 0.9 INJECTION, SOLUTION INTRAVENOUS at 09:03

## 2017-03-13 RX ADMIN — ALLOPURINOL 300 MG: 100 TABLET ORAL at 08:03

## 2017-03-13 NOTE — SUBJECTIVE & OBJECTIVE
Past Medical History:   Diagnosis Date    Back problem     Hyperlipidemia     Hypertension        Past Surgical History:   Procedure Laterality Date    BACK SURGERY      CARPAL TUNNEL RELEASE Left     CERVICAL FUSION      x2    KNEE SURGERY  left tibial osteotomy as child    SPINAL FUSION         Review of patient's allergies indicates:   Allergen Reactions    Bee sting [allergen ext-venom-honey bee]        Current Facility-Administered Medications on File Prior to Encounter   Medication    [COMPLETED] 0.9%  NaCl infusion    [COMPLETED] acetaminophen tablet 1,000 mg    [COMPLETED] ciprofloxacin (CIPRO)400mg/200ml D5W IVPB 400 mg    [COMPLETED] ibuprofen tablet 800 mg    [COMPLETED] lactated ringers bolus 1,974 mL    [COMPLETED] vancomycin 1 g in dextrose 5 % 250 mL IVPB (ready to mix system)    [DISCONTINUED] cefepime in dextrose 5 % 1 gram/50 mL IVPB 1 g     Current Outpatient Prescriptions on File Prior to Encounter   Medication Sig    acetaminophen (TYLENOL) 500 MG tablet Take 500 mg by mouth every 6 (six) hours as needed.    allopurinol (ZYLOPRIM) 300 MG tablet Take 300 mg by mouth once daily.    amitriptyline (ELAVIL) 75 MG tablet Take 75 mg by mouth every evening.    atorvastatin (LIPITOR) 20 MG tablet Take 20 mg by mouth once daily.    baclofen (LIORESAL) 10 MG tablet Take 10 mg by mouth 4 (four) times daily.    dantrolene (DANTRIUM) 25 MG Cap Take 100 mg by mouth 3 (three) times daily.    hydrocodone-acetaminophen 5-325mg (NORCO) 5-325 mg per tablet Take 1 tablet by mouth every 6 (six) hours as needed for Pain.    ibuprofen (ADVIL,MOTRIN) 200 MG tablet Take 200 mg by mouth every 6 (six) hours as needed.    multivitamin capsule Take 1 capsule by mouth once daily.    oxycodone-acetaminophen (PERCOCET) 5-325 mg per tablet Take 1 tablet by mouth every 6 (six) hours as needed for Pain.    sulfamethoxazole-trimethoprim 400-80mg (BACTRIM,SEPTRA) 400-80 mg per tablet Take 1 tablet by mouth  2 (two) times daily.    tizanidine (ZANAFLEX) 6 mg capsule Take 4 mg by mouth 3 (three) times daily.      Family History     Problem Relation (Age of Onset)    Diabetes Mother        Social History Main Topics    Smoking status: Current Every Day Smoker    Smokeless tobacco: Not on file    Alcohol use 4.2 oz/week     7 Shots of liquor per week    Drug use: No    Sexual activity: Not on file     Review of Systems   Constitutional: Positive for fever. Negative for activity change, appetite change, chills, diaphoresis, fatigue and unexpected weight change.   HENT: Negative.    Eyes: Negative.    Respiratory: Positive for cough. Negative for chest tightness, shortness of breath and wheezing.    Cardiovascular: Negative for chest pain, palpitations and leg swelling.   Gastrointestinal: Negative for abdominal distention, abdominal pain, blood in stool, constipation, diarrhea, nausea and vomiting.   Endocrine: Negative.    Genitourinary: Negative for dysuria and hematuria.   Musculoskeletal: Negative.    Neurological: Negative for dizziness, seizures, syncope, weakness and light-headedness.   Psychiatric/Behavioral: Negative.      Objective:     Vital Signs (Most Recent):  Temp: 98.6 °F (37 °C) (03/12/17 2322)  Pulse: 82 (03/12/17 2322)  Resp: 18 (03/12/17 2322)  BP: 130/72 (03/12/17 2322)  SpO2: (!) 94 % (03/12/17 2322) Vital Signs (24h Range):  Temp:  [98 °F (36.7 °C)-102.4 °F (39.1 °C)] 98.6 °F (37 °C)  Pulse:  [] 82  Resp:  [18-22] 18  SpO2:  [88 %-95 %] 94 %  BP: (130-154)/(72-92) 130/72     Weight: 65.8 kg (145 lb)  Body mass index is 23.4 kg/(m^2).    Physical Exam   Constitutional: He is oriented to person, place, and time. He appears well-developed and well-nourished. No distress.   HENT:   Head: Normocephalic and atraumatic.   Right Ear: External ear normal.   Left Ear: External ear normal.   Nose: Nose normal.   Eyes: Right eye exhibits no discharge. Left eye exhibits no discharge.   Neck: Normal  range of motion.   Cardiovascular:   Normal rate and rhythm with an positive S4   Pulmonary/Chest: Effort normal and breath sounds normal. No respiratory distress. He has no wheezes. He has no rales. He exhibits no tenderness.   Abdominal: Soft. Bowel sounds are normal. He exhibits no distension. There is no tenderness. There is no rebound and no guarding.   Musculoskeletal: Normal range of motion. He exhibits no edema.   Neurological: He is alert and oriented to person, place, and time.   Skin: Skin is warm and dry. He is not diaphoretic. No erythema.   Psychiatric: He has a normal mood and affect. His behavior is normal. Judgment and thought content normal.   Nursing note and vitals reviewed.       Significant Labs: All pertinent labs within the past 24 hours have been reviewed.    Significant Imaging: I have reviewed and interpreted all pertinent imaging results/findings within the past 24 hours.

## 2017-03-13 NOTE — ASSESSMENT & PLAN NOTE
The patient has a CURB-65 score of 0, and does not meet criteria for healthcare-associated pneumonia.  I have reviewed the chest X-ray and it reveals a right lower lobe infiltrate that given his presentation is suspicious for infection.  Oxygen saturations are stable.  Blood and sputum cultures are pending.  Will start empiric antibiotic therapy.

## 2017-03-13 NOTE — PLAN OF CARE
03/13/17 0850   Discharge Assessment   Assessment Type Discharge Planning Assessment   Confirmed/corrected address and phone number on facesheet? Yes   Assessment information obtained from? Patient   Type of Healthcare Directive Received Living will  (Pt has a will, [facility] does not have a copy. )   Prior to hospitilization cognitive status: Alert/Oriented   Prior to hospitalization functional status: Assistive Equipment   Current cognitive status: Alert/Oriented   Current Functional Status: Assistive Equipment   Arrived From admitted as an inpatient   Lives With spouse   Able to Return to Prior Arrangements yes   Is patient able to care for self after discharge? Yes   How many people do you have in your home that can help with your care after discharge? 1   Who are your caregiver(s) and their phone number(s)? janice- [phone]   Patient's perception of discharge disposition admitted as an inpatient   Readmission Within The Last 30 Days no previous admission in last 30 days   Patient currently being followed by outpatient case management? No   Patient currently receives home health services? No   Does the patient currently use HME? Yes   Patient currently receives private duty nursing? No   Patient currently receives any other outside agency services? No   Equipment Currently Used at Home bath bench;walker, rolling   Do you have any problems affording any of your prescribed medications? No   Is the patient taking medications as prescribed? yes   Do you have any financial concerns preventing you from receiving the healthcare you need? No   Does the patient have transportation to healthcare appointments? Yes   Transportation Available car   On Dialysis? No   Does the patient receive services at the [facility]? No   Are there any open cases? No   Discharge Plan A Home with family   Discharge Plan B Home with family   Patient/Family In Agreement With Plan yes     Pt information verified. SW explained the role  of CM and provided pt with SW contact information.

## 2017-03-13 NOTE — H&P
"[facility] Hospital Medicine  History & Physical    Patient Name: Dereck Soria  MRN: [id number]  Admission Date: 3/12/2017  Attending Physician: Liyah Gross MD   Primary Care Provider: Dusty Chan MD         Patient information was obtained from patient.     Subjective:     Principal Problem:Community acquired bacterial pneumonia    Chief Complaint: Coughing and fever for two days.       HPI: Mr. Dereck Soria is a 61 y.o. male with essential hypertension, chronic back pain, and tobacco abuse who presents to [facility] ED with complaints of cough for one week.  He reports that the cough has become productive in the last couple days with yellow-green sputum, and today his wife noticed that he was warm to touch.  He says that the cough can get so bad at times that he has some right-sided "rib pain".  He has not been short of breath nor has he had any wheezing.  He denies any nausea, vomiting, anterior chest pain, palpitations, diaphoresis, night sweats, or unexplained weight loss.  There has not been any recent travel but he thinks his grand children has been sick.  He has had both his influenza and pneumococcal vaccinations this year.      Chart Review:  Previous Hospitalizations  Date Hospital Diagnosis   Apr 2015 [facility] Right hand carpal tunnel release    Mar 2015 [facility] Left hand carpal tunnel release      Outpatient Follow-Up  Date of Visit Physician Service   Dec 2013 Aide Linda MD Sports Medicine     Past Medical History:   Diagnosis Date    Back problem     Hyperlipidemia     Hypertension        Past Surgical History:   Procedure Laterality Date    BACK SURGERY      CARPAL TUNNEL RELEASE Left     CERVICAL FUSION      x2    KNEE SURGERY  left tibial osteotomy as child    SPINAL FUSION         Review of patient's allergies indicates:   Allergen Reactions    Bee sting [allergen ext-venom-honey bee]        Current Facility-Administered Medications on File Prior " to Encounter   Medication    [COMPLETED] 0.9%  NaCl infusion    [COMPLETED] acetaminophen tablet 1,000 mg    [COMPLETED] ciprofloxacin (CIPRO)400mg/200ml D5W IVPB 400 mg    [COMPLETED] ibuprofen tablet 800 mg    [COMPLETED] lactated ringers bolus 1,974 mL    [COMPLETED] vancomycin 1 g in dextrose 5 % 250 mL IVPB (ready to mix system)    [DISCONTINUED] cefepime in dextrose 5 % 1 gram/50 mL IVPB 1 g     Current Outpatient Prescriptions on File Prior to Encounter   Medication Sig    acetaminophen (TYLENOL) 500 MG tablet Take 500 mg by mouth every 6 (six) hours as needed.    allopurinol (ZYLOPRIM) 300 MG tablet Take 300 mg by mouth once daily.    amitriptyline (ELAVIL) 75 MG tablet Take 75 mg by mouth every evening.    atorvastatin (LIPITOR) 20 MG tablet Take 20 mg by mouth once daily.    baclofen (LIORESAL) 10 MG tablet Take 10 mg by mouth 4 (four) times daily.    dantrolene (DANTRIUM) 25 MG Cap Take 100 mg by mouth 3 (three) times daily.    hydrocodone-acetaminophen 5-325mg (NORCO) 5-325 mg per tablet Take 1 tablet by mouth every 6 (six) hours as needed for Pain.    ibuprofen (ADVIL,MOTRIN) 200 MG tablet Take 200 mg by mouth every 6 (six) hours as needed.    multivitamin capsule Take 1 capsule by mouth once daily.    oxycodone-acetaminophen (PERCOCET) 5-325 mg per tablet Take 1 tablet by mouth every 6 (six) hours as needed for Pain.    sulfamethoxazole-trimethoprim 400-80mg (BACTRIM,SEPTRA) 400-80 mg per tablet Take 1 tablet by mouth 2 (two) times daily.    tizanidine (ZANAFLEX) 6 mg capsule Take 4 mg by mouth 3 (three) times daily.      Family History     Problem Relation (Age of Onset)    Diabetes Mother        Social History Main Topics    Smoking status: Current Every Day Smoker    Smokeless tobacco: Not on file    Alcohol use 4.2 oz/week     7 Shots of liquor per week    Drug use: No    Sexual activity: Not on file     Review of Systems   Constitutional: Positive for fever. Negative for  activity change, appetite change, chills, diaphoresis, fatigue and unexpected weight change.   HENT: Negative.    Eyes: Negative.    Respiratory: Positive for cough. Negative for chest tightness, shortness of breath and wheezing.    Cardiovascular: Negative for chest pain, palpitations and leg swelling.   Gastrointestinal: Negative for abdominal distention, abdominal pain, blood in stool, constipation, diarrhea, nausea and vomiting.   Endocrine: Negative.    Genitourinary: Negative for dysuria and hematuria.   Musculoskeletal: Negative.    Neurological: Negative for dizziness, seizures, syncope, weakness and light-headedness.   Psychiatric/Behavioral: Negative.      Objective:     Vital Signs (Most Recent):  Temp: 98.6 °F (37 °C) (03/12/17 2322)  Pulse: 82 (03/12/17 2322)  Resp: 18 (03/12/17 2322)  BP: 130/72 (03/12/17 2322)  SpO2: (!) 94 % (03/12/17 2322) Vital Signs (24h Range):  Temp:  [98 °F (36.7 °C)-102.4 °F (39.1 °C)] 98.6 °F (37 °C)  Pulse:  [] 82  Resp:  [18-22] 18  SpO2:  [88 %-95 %] 94 %  BP: (130-154)/(72-92) 130/72     Weight: 65.8 kg (145 lb)  Body mass index is 23.4 kg/(m^2).    Physical Exam   Constitutional: He is oriented to person, place, and time. He appears well-developed and well-nourished. No distress.   HENT:   Head: Normocephalic and atraumatic.   Right Ear: External ear normal.   Left Ear: External ear normal.   Nose: Nose normal.   Eyes: Right eye exhibits no discharge. Left eye exhibits no discharge.   Neck: Normal range of motion.   Cardiovascular:   Normal rate and rhythm with an positive S4   Pulmonary/Chest: Effort normal and breath sounds normal. No respiratory distress. He has no wheezes. He has no rales. He exhibits no tenderness.   Abdominal: Soft. Bowel sounds are normal. He exhibits no distension. There is no tenderness. There is no rebound and no guarding.   Musculoskeletal: Normal range of motion. He exhibits no edema.   Neurological: He is alert and oriented to person,  place, and time.   Skin: Skin is warm and dry. He is not diaphoretic. No erythema.   Psychiatric: He has a normal mood and affect. His behavior is normal. Judgment and thought content normal.   Nursing note and vitals reviewed.       Significant Labs: All pertinent labs within the past 24 hours have been reviewed.    Significant Imaging: I have reviewed and interpreted all pertinent imaging results/findings within the past 24 hours.    Assessment/Plan:     * Community acquired bacterial pneumonia  The patient has a CURB-65 score of 0, and does not meet criteria for healthcare-associated pneumonia.  I have reviewed the chest X-ray and it reveals a right lower lobe infiltrate that given his presentation is suspicious for infection.  Oxygen saturations are stable.  Blood and sputum cultures are pending.  Will start empiric antibiotic therapy.    Sepsis  This patient meets criteria for sepsis given fever, tachycardia, tachypnea, and pneumonia; there is no evidence of end-organ dysfunction.  Blood cultures are pending; will start IV fluid hydration and broad spectrum antibiotics.    Essential hypertension  Poorly-controlled not on medications at this time.  Will provide as-needed clonidine.    Chronic back pain  Stable; there are no acute issues.    Tobacco abuse  Patient was counseled on smoking cessation and he will be provided a nicotine transdermal patch applied while inpatient.  Will provide additional smoking cessation counseling prior to discharge.    Hyperlipidemia  Will continue his home regimen of atorvastatin.    VTE Risk Mitigation         Ordered     enoxaparin injection 40 mg  Daily     Route:  Subcutaneous        03/12/17 2310     Medium Risk of VTE  Once      03/12/17 2310            Total time spent on case: 45 minutes.        Kole Jacob M.D.  Staff Nocturnist  Department of [facility] Medicine  [facility]  Pager: [phone]

## 2017-03-13 NOTE — ED NOTES
Report taken from DOLLY Hoskins reports pt here for back pain, chest xrays show RLL pneumonia, pt with yellow thick sputum, BC x 2 and sputum cultures pending, Vancomycin outstanding

## 2017-03-13 NOTE — PROGRESS NOTES
"[facility] Medicine  Progress Note    Patient Name: Dereck Soria  MRN: [id number]  Patient Class: IP- Inpatient   Admission Date: 3/12/2017  Length of Stay: 1 days  Attending Physician: Garo Oviedo MD  Primary Care Provider: Dusty Chan MD        Subjective:     Principal Problem:Community acquired bacterial pneumonia    HPI:  Mr. Dereck Soria is a 61 y.o. male with essential hypertension, chronic back pain, and tobacco abuse who presents to [facility] ED with complaints of cough for one week.  He reports that the cough has become productive in the last couple days with yellow-green sputum, and today his wife noticed that he was warm to touch.  He says that the cough can get so bad at times that he has some right-sided "rib pain".  He has not been short of breath nor has he had any wheezing.  He denies any nausea, vomiting, anterior chest pain, palpitations, diaphoresis, night sweats, or unexplained weight loss.  There has not been any recent travel but he thinks his grand children has been sick.  He has had both his influenza and pneumococcal vaccinations this year.      Hospital Course:  The patient was admitted to the hospital for treatment of CAP.      Interval History: complains of back pain with coughing.     Review of Systems   Constitutional: Negative for activity change.   HENT: Positive for congestion.    Respiratory: Negative for chest tightness and shortness of breath.    Cardiovascular: Negative for chest pain.   Gastrointestinal: Negative for abdominal pain.   Musculoskeletal: Positive for back pain.     Objective:     Vital Signs (Most Recent):  Temp: 100 °F (37.8 °C) (03/13/17 0712)  Pulse: 93 (03/13/17 0712)  Resp: 18 (03/13/17 0712)  BP: (!) 172/86 (03/13/17 0712)  SpO2: (!) 94 % (03/13/17 0712) Vital Signs (24h Range):  Temp:  [98 °F (36.7 °C)-102.4 °F (39.1 °C)] 100 °F (37.8 °C)  Pulse:  [] 93  Resp:  [18-22] 18  SpO2:  [88 %-95 %] 94 %  BP: " (130-172)/(72-92) 172/86     Weight: 65.8 kg (145 lb)  Body mass index is 23.4 kg/(m^2).    Intake/Output Summary (Last 24 hours) at 03/13/17 0842  Last data filed at 03/13/17 0600   Gross per 24 hour   Intake             1140 ml   Output             1150 ml   Net              -10 ml      Physical Exam   Constitutional: He is oriented to person, place, and time. He appears well-developed and well-nourished.   HENT:   Head: Normocephalic and atraumatic.   Cardiovascular: Normal rate.    Pulmonary/Chest: Effort normal.   Neurological: He is alert and oriented to person, place, and time.   Vitals reviewed.      Significant Labs:   BMP:   Recent Labs  Lab 03/13/17  0546   *      K 3.5      CO2 26   BUN 9   CREATININE 0.8   CALCIUM 8.8   MG 1.8     CBC:   Recent Labs  Lab 03/13/17  0546   WBC 10.21   HGB 11.2*   HCT 33.0*   *       Significant Imagin    Assessment/Plan:      * Community acquired bacterial pneumonia  The patient has a CURB-65 score of 0, and does not meet criteria for healthcare-associated pneumonia.  I have reviewed the chest X-ray and it reveals a right lower lobe infiltrate that given his presentation is suspicious for infection.  Oxygen saturations are stable.  Blood and sputum cultures are pending.  Will start empiric antibiotic therapy.    Sepsis  This patient meets criteria for sepsis given fever, tachycardia, tachypnea, and pneumonia; there is no evidence of end-organ dysfunction.  Blood cultures are pending; will start IV fluid hydration and broad spectrum antibiotics.    Essential hypertension  Poorly-controlled not on medications at this time.  Will provide as-needed clonidine.    Chronic back pain  Stable; there are no acute issues.  toradol and morphine today     Tobacco abuse  Patient was counseled on smoking cessation and he will be provided a nicotine transdermal patch applied while inpatient.  Will provide additional smoking cessation counseling prior to  discharge.    Hyperlipidemia  Will continue his home regimen of atorvastatin.    VTE Risk Mitigation         Ordered     enoxaparin injection 40 mg  Daily     Route:  Subcutaneous        03/12/17 2310     Medium Risk of VTE  Once      03/12/17 2310      continue Abx today. Follow blood cultures. Likely home tomorrow.  Curb score of 0.      Garo Adler MD  Department of Hospital Medicine   [facility]

## 2017-03-13 NOTE — ASSESSMENT & PLAN NOTE
This patient meets criteria for sepsis given fever, tachycardia, tachypnea, and pneumonia; there is no evidence of end-organ dysfunction.  Blood cultures are pending; will start IV fluid hydration and broad spectrum antibiotics.

## 2017-03-13 NOTE — PLAN OF CARE
Problem: Patient Care Overview  Goal: Plan of Care Review  Outcome: Ongoing (interventions implemented as appropriate)  Patient AAOX4.  Patient denies pain.  IV site cdi infusing normal saline @ 100cc/hr.  Patient ambulates with walker.  Patient skin cdi.  Patient has productive cough; green/yellowish sputum.  IV antibiotics start in the AM.  Patient remained fall free during shift.  Bed in lowest position.  Call light within reach.  Will continue to monitor.      Problem: Fall Risk (Adult)  Goal: Identify Related Risk Factors and Signs and Symptoms  Related risk factors and signs and symptoms are identified upon initiation of Human Response Clinical Practice Guideline (CPG)   Outcome: Ongoing (interventions implemented as appropriate)  Patient remained fall free during shift.  Bed in lowest position.  Call light within reach.  Will continue to monitor.

## 2017-03-13 NOTE — SUBJECTIVE & OBJECTIVE
Interval History: complains of back pain with coughing.     Review of Systems   Constitutional: Negative for activity change.   HENT: Positive for congestion.    Respiratory: Negative for chest tightness and shortness of breath.    Cardiovascular: Negative for chest pain.   Gastrointestinal: Negative for abdominal pain.   Musculoskeletal: Positive for back pain.     Objective:     Vital Signs (Most Recent):  Temp: 100 °F (37.8 °C) (03/13/17 0712)  Pulse: 93 (03/13/17 0712)  Resp: 18 (03/13/17 0712)  BP: (!) 172/86 (03/13/17 0712)  SpO2: (!) 94 % (03/13/17 0712) Vital Signs (24h Range):  Temp:  [98 °F (36.7 °C)-102.4 °F (39.1 °C)] 100 °F (37.8 °C)  Pulse:  [] 93  Resp:  [18-22] 18  SpO2:  [88 %-95 %] 94 %  BP: (130-172)/(72-92) 172/86     Weight: 65.8 kg (145 lb)  Body mass index is 23.4 kg/(m^2).    Intake/Output Summary (Last 24 hours) at 03/13/17 0842  Last data filed at 03/13/17 0600   Gross per 24 hour   Intake             1140 ml   Output             1150 ml   Net              -10 ml      Physical Exam   Constitutional: He is oriented to person, place, and time. He appears well-developed and well-nourished.   HENT:   Head: Normocephalic and atraumatic.   Cardiovascular: Normal rate.    Pulmonary/Chest: Effort normal.   Neurological: He is alert and oriented to person, place, and time.   Vitals reviewed.      Significant Labs:   BMP:   Recent Labs  Lab 03/13/17  0546   *      K 3.5      CO2 26   BUN 9   CREATININE 0.8   CALCIUM 8.8   MG 1.8     CBC:   Recent Labs  Lab 03/13/17  0546   WBC 10.21   HGB 11.2*   HCT 33.0*   *       Significant Imagin

## 2017-03-13 NOTE — ED NOTES
PT VERIFIES THAT NAME AND  ARE CORRECT.     LOC: The patient is awake, alert and aware of environment with an appropriate affect, the patient is oriented x 3 and answers all questions appropriately.    APPEARANCE: Patient resting comfortably and in no acute distress.    SKIN: The skin is warm and dry, color consistent with ethnicity, patient has normal skin turgor and moist mucus membranes, skin intact, no breakdown, brusing or alterations in skin integrity noted.    MUSCULOSKELETAL: Patient moving all extremities well, CMS intact, no obvious swelling, deformity or trauma noted, pt reports he walks with a walker due to unsteady gait from old spinal injury    RESPIRATORY: Airway is open and patent, trachea is midline, respirations are spontaneous, even and non-labored, lung sounds clear to ausculation anteriorly and posteriorly, in upper lobes, diminished on right, no use of accessory muscles noted. Patient denies any shortness of breath or difficulty breathing at this time, remains on 2L NC O2     CARDIAC: Patient has a normal rate and rhythm, no periphreal edema noted, capillary refill < 3 seconds. Patient denies any chest pain at this time     ABDOMEN: Soft and non tender to palpation, bowel sound active x4 quads, no distention noted, denies any nausea, vomiting, or diarrhea. Denies any intolerance of PO fluids or foods    /GI: patient is continent to bowel and bladder. Denies any trouble urinating.  Reports normal bowel patterns    NEUROLOGIC: PERRLA, eyes open spontaneously, behavior appropriate to situation, follows all commands, facial expression symmetrical, bilateral hand grasp equal, no arm drift noted, purposeful motor response noted, denies any numbness or tingling at this time

## 2017-03-13 NOTE — ASSESSMENT & PLAN NOTE
Patient was counseled on smoking cessation and he will be provided a nicotine transdermal patch applied while inpatient.  Will provide additional smoking cessation counseling prior to discharge.

## 2017-03-13 NOTE — ED NOTES
Pt resting comfortable, family remains at bedside, offered toileting assistance and change in position, pillow given, head of bed lowered. Pt awaiting transport to [place] [id number], Pt with no complaints at this time, breathing even and non-labored,  remains on all monitoring devices, will continue to monitor.

## 2017-03-14 PROCEDURE — 11000001 HC ACUTE MED/SURG PRIVATE ROOM

## 2017-03-14 PROCEDURE — 63600175 PHARM REV CODE 636 W HCPCS: Performed by: INTERNAL MEDICINE

## 2017-03-14 PROCEDURE — 94640 AIRWAY INHALATION TREATMENT: CPT

## 2017-03-14 PROCEDURE — 25000242 PHARM REV CODE 250 ALT 637 W/ HCPCS: Performed by: INTERNAL MEDICINE

## 2017-03-14 PROCEDURE — 25000003 PHARM REV CODE 250: Performed by: INTERNAL MEDICINE

## 2017-03-14 RX ORDER — KETOROLAC TROMETHAMINE 30 MG/ML
30 INJECTION, SOLUTION INTRAMUSCULAR; INTRAVENOUS ONCE
Status: ACTIVE | OUTPATIENT
Start: 2017-03-14 | End: 2017-03-17

## 2017-03-14 RX ORDER — KETOROLAC TROMETHAMINE 30 MG/ML
30 INJECTION, SOLUTION INTRAMUSCULAR; INTRAVENOUS ONCE
Status: COMPLETED | OUTPATIENT
Start: 2017-03-14 | End: 2017-03-14

## 2017-03-14 RX ORDER — OXYCODONE AND ACETAMINOPHEN 7.5; 325 MG/1; MG/1
1 TABLET ORAL EVERY 4 HOURS PRN
Status: DISCONTINUED | OUTPATIENT
Start: 2017-03-14 | End: 2017-03-17 | Stop reason: HOSPADM

## 2017-03-14 RX ADMIN — OXYCODONE HYDROCHLORIDE AND ACETAMINOPHEN 1 TABLET: 7.5; 325 TABLET ORAL at 09:03

## 2017-03-14 RX ADMIN — IPRATROPIUM BROMIDE AND ALBUTEROL SULFATE 3 ML: .5; 3 SOLUTION RESPIRATORY (INHALATION) at 08:03

## 2017-03-14 RX ADMIN — RAMELTEON 8 MG: 8 TABLET, FILM COATED ORAL at 08:03

## 2017-03-14 RX ADMIN — NICOTINE 1 PATCH: 21 PATCH, EXTENDED RELEASE TRANSDERMAL at 08:03

## 2017-03-14 RX ADMIN — ALLOPURINOL 300 MG: 100 TABLET ORAL at 08:03

## 2017-03-14 RX ADMIN — VALSARTAN 320 MG: 80 TABLET, FILM COATED ORAL at 08:03

## 2017-03-14 RX ADMIN — ATORVASTATIN CALCIUM 20 MG: 10 TABLET, FILM COATED ORAL at 08:03

## 2017-03-14 RX ADMIN — KETOROLAC TROMETHAMINE 30 MG: 30 INJECTION, SOLUTION INTRAMUSCULAR at 09:03

## 2017-03-14 RX ADMIN — IPRATROPIUM BROMIDE AND ALBUTEROL SULFATE 3 ML: .5; 3 SOLUTION RESPIRATORY (INHALATION) at 03:03

## 2017-03-14 RX ADMIN — CEFTRIAXONE 1 G: 1 INJECTION, SOLUTION INTRAVENOUS at 08:03

## 2017-03-14 RX ADMIN — AMLODIPINE BESYLATE 10 MG: 5 TABLET ORAL at 08:03

## 2017-03-14 RX ADMIN — AZITHROMYCIN MONOHYDRATE 500 MG: 500 INJECTION, POWDER, LYOPHILIZED, FOR SOLUTION INTRAVENOUS at 08:03

## 2017-03-14 RX ADMIN — TIZANIDINE 6 MG: 2 TABLET ORAL at 04:03

## 2017-03-14 RX ADMIN — ENOXAPARIN SODIUM 40 MG: 100 INJECTION SUBCUTANEOUS at 12:03

## 2017-03-14 NOTE — ASSESSMENT & PLAN NOTE
The patient has a CURB-65 score of 0, and does not meet criteria for healthcare-associated pneumonia.  I have reviewed the chest X-ray and it reveals a right lower lobe infiltrate that given his presentation is suspicious for infection.  Oxygen saturations are stable. Blood cultures are negative. Borderline 02 sats 90% on RA resting.

## 2017-03-14 NOTE — PLAN OF CARE
Problem: Patient Care Overview  Goal: Plan of Care Review  Outcome: Ongoing (interventions implemented as appropriate)  Pt given x1 IV toradol, x1 hydrocodone 10/325mg, resp tx x2, able to address needs, tolerating diet, safety maintained, bed low locked and in position will cont plan of care

## 2017-03-14 NOTE — ASSESSMENT & PLAN NOTE
This patient meets criteria for sepsis given fever, tachycardia, tachypnea, and pneumonia; there is no evidence of end-organ dysfunction.  Resolved.

## 2017-03-14 NOTE — PLAN OF CARE
Problem: Patient Care Overview  Goal: Plan of Care Review  Outcome: Ongoing (interventions implemented as appropriate)  Lung fields no longer coarse, patient afebrile, WBCs 10.2. No falls, trauma or injury this shift. Skin remains intact. Continue to monitor patient and continue width plan of care.

## 2017-03-14 NOTE — SUBJECTIVE & OBJECTIVE
Interval History: No new issues. Still notes back pain with coughing.  02 sats 90% at rest.     Review of Systems   Constitutional: Negative for activity change.   Respiratory: Negative for chest tightness and shortness of breath.    Cardiovascular: Negative for chest pain and palpitations.   Gastrointestinal: Negative for abdominal pain.   Genitourinary: Negative for difficulty urinating.   Musculoskeletal: Positive for back pain.     Objective:     Vital Signs (Most Recent):  Temp: 99.3 °F (37.4 °C) (03/14/17 0800)  Pulse: 84 (03/14/17 0800)  Resp: 18 (03/14/17 0800)  BP: (!) 170/83 (03/14/17 0800)  SpO2: (!) 93 % (03/14/17 0800) Vital Signs (24h Range):  Temp:  [99.3 °F (37.4 °C)-100.1 °F (37.8 °C)] 99.3 °F (37.4 °C)  Pulse:  [76-91] 84  Resp:  [18-20] 18  SpO2:  [93 %-96 %] 93 %  BP: (129-181)/(69-84) 170/83     Weight: 66 kg (145 lb 7.4 oz)  Body mass index is 23.48 kg/(m^2).    Intake/Output Summary (Last 24 hours) at 03/14/17 0827  Last data filed at 03/14/17 0800   Gross per 24 hour   Intake             1380 ml   Output             3180 ml   Net            -1800 ml      Physical Exam   Constitutional: He is oriented to person, place, and time. He appears well-developed and well-nourished.   Cardiovascular: Normal rate.    Pulmonary/Chest: Effort normal and breath sounds normal.   Abdominal: Soft.   Neurological: He is alert and oriented to person, place, and time.   Skin: Skin is warm and dry.   Vitals reviewed.      Significant Labs:   BMP:   Recent Labs  Lab 03/13/17  0546   *      K 3.5      CO2 26   BUN 9   CREATININE 0.8   CALCIUM 8.8   MG 1.8     CBC:   Recent Labs  Lab 03/13/17  0546   WBC 10.21   HGB 11.2*   HCT 33.0*   *       Significant Imaging:

## 2017-03-14 NOTE — PROGRESS NOTES
"[facility] Hospital Medicine  Progress Note    Patient Name: Dereck Soria  MRN: [id number]  Patient Class: IP- Inpatient   Admission Date: 3/12/2017  Length of Stay: 2 days  Attending Physician: Garo Oviedo MD  Primary Care Provider: Dusty Chan MD        Subjective:     Principal Problem:Community acquired bacterial pneumonia    HPI:  Mr. Dereck Soria is a 61 y.o. male with essential hypertension, chronic back pain, and tobacco abuse who presents to [facility] ED with complaints of cough for one week.  He reports that the cough has become productive in the last couple days with yellow-green sputum, and today his wife noticed that he was warm to touch.  He says that the cough can get so bad at times that he has some right-sided "rib pain".  He has not been short of breath nor has he had any wheezing.  He denies any nausea, vomiting, anterior chest pain, palpitations, diaphoresis, night sweats, or unexplained weight loss.  There has not been any recent travel but he thinks his grand children has been sick.  He has had both his influenza and pneumococcal vaccinations this year.      Hospital Course:  The patient was admitted to the hospital for treatment of CAP.      Interval History: No new issues. Still notes back pain with coughing.  02 sats 90% at rest.     Review of Systems   Constitutional: Negative for activity change.   Respiratory: Negative for chest tightness and shortness of breath.    Cardiovascular: Negative for chest pain and palpitations.   Gastrointestinal: Negative for abdominal pain.   Genitourinary: Negative for difficulty urinating.   Musculoskeletal: Positive for back pain.     Objective:     Vital Signs (Most Recent):  Temp: 99.3 °F (37.4 °C) (03/14/17 0800)  Pulse: 84 (03/14/17 0800)  Resp: 18 (03/14/17 0800)  BP: (!) 170/83 (03/14/17 0800)  SpO2: (!) 93 % (03/14/17 0800) Vital Signs (24h Range):  Temp:  [99.3 °F (37.4 °C)-100.1 °F (37.8 °C)] 99.3 °F " (37.4 °C)  Pulse:  [76-91] 84  Resp:  [18-20] 18  SpO2:  [93 %-96 %] 93 %  BP: (129-181)/(69-84) 170/83     Weight: 66 kg (145 lb 7.4 oz)  Body mass index is 23.48 kg/(m^2).    Intake/Output Summary (Last 24 hours) at 03/14/17 0827  Last data filed at 03/14/17 0800   Gross per 24 hour   Intake             1380 ml   Output             3180 ml   Net            -1800 ml      Physical Exam   Constitutional: He is oriented to person, place, and time. He appears well-developed and well-nourished.   Cardiovascular: Normal rate.    Pulmonary/Chest: Effort normal and breath sounds normal.   Abdominal: Soft.   Neurological: He is alert and oriented to person, place, and time.   Skin: Skin is warm and dry.   Vitals reviewed.      Significant Labs:   BMP:   Recent Labs  Lab 03/13/17  0546   *      K 3.5      CO2 26   BUN 9   CREATININE 0.8   CALCIUM 8.8   MG 1.8     CBC:   Recent Labs  Lab 03/13/17  0546   WBC 10.21   HGB 11.2*   HCT 33.0*   *       Significant Imaging:     Assessment/Plan:      * Community acquired bacterial pneumonia  The patient has a CURB-65 score of 0, and does not meet criteria for healthcare-associated pneumonia.  I have reviewed the chest X-ray and it reveals a right lower lobe infiltrate that given his presentation is suspicious for infection.  Oxygen saturations are stable. Blood cultures are negative. Borderline 02 sats 90% on RA resting.     Sepsis  This patient meets criteria for sepsis given fever, tachycardia, tachypnea, and pneumonia; there is no evidence of end-organ dysfunction.  Resolved.     Essential hypertension  Poorly-controlled not on medications at this time.  Will provide as-needed clonidine.- restarting home med of Valsartan     Chronic back pain  Stable; there are no acute issues.  toradol and morphine today     Tobacco abuse  Patient was counseled on smoking cessation and he will be provided a nicotine transdermal patch applied while inpatient.  Will  provide additional smoking cessation counseling prior to discharge.    Hyperlipidemia  Will continue his home regimen of atorvastatin.    VTE Risk Mitigation         Ordered     enoxaparin injection 40 mg  Daily     Route:  Subcutaneous        03/12/17 2310     Medium Risk of VTE  Once      03/12/17 2310        Will monitor for one more day. Still remains somewhat hypoxic and would drop < 88% with ambulation.  Home tomorrow.      Garo Adler MD  Department of Hospital Medicine   [facility]

## 2017-03-14 NOTE — ASSESSMENT & PLAN NOTE
Poorly-controlled not on medications at this time.  Will provide as-needed clonidine.- restarting home med of Valsartan

## 2017-03-15 PROBLEM — N39.0 UTI (URINARY TRACT INFECTION): Status: ACTIVE | Noted: 2017-03-15

## 2017-03-15 PROBLEM — A41.9 SEPSIS: Status: RESOLVED | Noted: 2017-03-12 | Resolved: 2017-03-15

## 2017-03-15 PROBLEM — N39.0 UTI (URINARY TRACT INFECTION): Status: RESOLVED | Noted: 2017-03-15 | Resolved: 2017-03-15

## 2017-03-15 LAB
BACTERIA SPEC AEROBE CULT: NORMAL
GRAM STN SPEC: NORMAL

## 2017-03-15 PROCEDURE — 94640 AIRWAY INHALATION TREATMENT: CPT

## 2017-03-15 PROCEDURE — 25000003 PHARM REV CODE 250: Performed by: INTERNAL MEDICINE

## 2017-03-15 PROCEDURE — 25000242 PHARM REV CODE 250 ALT 637 W/ HCPCS: Performed by: INTERNAL MEDICINE

## 2017-03-15 PROCEDURE — 63600175 PHARM REV CODE 636 W HCPCS: Performed by: INTERNAL MEDICINE

## 2017-03-15 PROCEDURE — 11000001 HC ACUTE MED/SURG PRIVATE ROOM

## 2017-03-15 RX ORDER — AMLODIPINE BESYLATE 10 MG/1
10 TABLET ORAL DAILY
Qty: 30 TABLET | Refills: 4 | Status: SHIPPED | OUTPATIENT
Start: 2017-03-15 | End: 2018-03-15

## 2017-03-15 RX ORDER — OXYCODONE AND ACETAMINOPHEN 7.5; 325 MG/1; MG/1
1 TABLET ORAL EVERY 4 HOURS PRN
Qty: 30 TABLET | Refills: 0 | Status: SHIPPED | OUTPATIENT
Start: 2017-03-15

## 2017-03-15 RX ORDER — MOXIFLOXACIN HYDROCHLORIDE 400 MG/1
400 TABLET ORAL DAILY
Qty: 7 TABLET | Refills: 0 | Status: SHIPPED | OUTPATIENT
Start: 2017-03-15 | End: 2017-03-22

## 2017-03-15 RX ADMIN — RAMELTEON 8 MG: 8 TABLET, FILM COATED ORAL at 09:03

## 2017-03-15 RX ADMIN — IPRATROPIUM BROMIDE AND ALBUTEROL SULFATE 3 ML: .5; 3 SOLUTION RESPIRATORY (INHALATION) at 09:03

## 2017-03-15 RX ADMIN — AMLODIPINE BESYLATE 10 MG: 5 TABLET ORAL at 10:03

## 2017-03-15 RX ADMIN — AZITHROMYCIN MONOHYDRATE 500 MG: 500 INJECTION, POWDER, LYOPHILIZED, FOR SOLUTION INTRAVENOUS at 12:03

## 2017-03-15 RX ADMIN — ENOXAPARIN SODIUM 40 MG: 100 INJECTION SUBCUTANEOUS at 12:03

## 2017-03-15 RX ADMIN — VALSARTAN 320 MG: 80 TABLET, FILM COATED ORAL at 10:03

## 2017-03-15 RX ADMIN — ALLOPURINOL 300 MG: 100 TABLET ORAL at 10:03

## 2017-03-15 RX ADMIN — NICOTINE 1 PATCH: 21 PATCH, EXTENDED RELEASE TRANSDERMAL at 10:03

## 2017-03-15 RX ADMIN — CEFTRIAXONE 1 G: 1 INJECTION, SOLUTION INTRAVENOUS at 10:03

## 2017-03-15 RX ADMIN — IPRATROPIUM BROMIDE AND ALBUTEROL SULFATE 3 ML: .5; 3 SOLUTION RESPIRATORY (INHALATION) at 08:03

## 2017-03-15 RX ADMIN — ATORVASTATIN CALCIUM 20 MG: 10 TABLET, FILM COATED ORAL at 10:03

## 2017-03-15 RX ADMIN — OXYCODONE HYDROCHLORIDE AND ACETAMINOPHEN 1 TABLET: 7.5; 325 TABLET ORAL at 09:03

## 2017-03-15 NOTE — PLAN OF CARE
Problem: Patient Care Overview  Goal: Plan of Care Review  Outcome: Ongoing (interventions implemented as appropriate)    03/15/17 4435   Coping/Psychosocial   Plan Of Care Reviewed With patient      No complaints this evening. VSS. Pt ready for discharge. POC continued.

## 2017-03-15 NOTE — PROGRESS NOTES
"[facility] Hospital Medicine  Progress Note    Patient Name: Dereck Soria  MRN: [id number]  Patient Class: IP- Inpatient   Admission Date: 3/12/2017  Length of Stay: 3 days  Attending Physician: Garo Oviedo MD  Primary Care Provider: Dusty Chan MD        Subjective:     Principal Problem:Community acquired bacterial pneumonia    HPI:  Mr. Dereck Soria is a 61 y.o. male with essential hypertension, chronic back pain, and tobacco abuse who presents to [facility] ED with complaints of cough for one week.  He reports that the cough has become productive in the last couple days with yellow-green sputum, and today his wife noticed that he was warm to touch.  He says that the cough can get so bad at times that he has some right-sided "rib pain".  He has not been short of breath nor has he had any wheezing.  He denies any nausea, vomiting, anterior chest pain, palpitations, diaphoresis, night sweats, or unexplained weight loss.  There has not been any recent travel but he thinks his grand children has been sick.  He has had both his influenza and pneumococcal vaccinations this year.      Hospital Course:  The patient was admitted to the hospital for treatment of CAP.  The patient was found to have Enterococcus F in his urine and ID was consulted.      Interval History: No new issues.     Review of Systems   Constitutional: Negative for activity change.   Respiratory: Negative for chest tightness and shortness of breath.    Cardiovascular: Negative for chest pain and palpitations.   Gastrointestinal: Negative for abdominal pain.   Genitourinary: Negative for difficulty urinating.   Musculoskeletal: Positive for back pain.     Objective:     Vital Signs (Most Recent):  Temp: 99.3 °F (37.4 °C) (03/14/17 2355)  Pulse: 99 (03/14/17 2355)  Resp: 18 (03/14/17 2355)  BP: 134/78 (03/14/17 2355)  SpO2: 98 % (03/14/17 2355) Vital Signs (24h Range):  Temp:  [98.7 °F (37.1 °C)-100.5 °F (38.1 " °C)] 99.3 °F (37.4 °C)  Pulse:  [] 99  Resp:  [18-20] 18  SpO2:  [85 %-98 %] 98 %  BP: (113-146)/(67-78) 134/78     Weight: 66 kg (145 lb 7.4 oz)  Body mass index is 23.48 kg/(m^2).    Intake/Output Summary (Last 24 hours) at 03/15/17 0818  Last data filed at 03/15/17 0400   Gross per 24 hour   Intake             1090 ml   Output             1925 ml   Net             -835 ml      Physical Exam   Constitutional: He is oriented to person, place, and time. He appears well-developed and well-nourished.   Cardiovascular: Normal rate.    Pulmonary/Chest: Effort normal and breath sounds normal.   Abdominal: Soft.   Neurological: He is alert and oriented to person, place, and time.   Skin: Skin is warm and dry.   Vitals reviewed.      Significant Labs: BMP: No results for input(s): GLU, NA, K, CL, CO2, BUN, CREATININE, CALCIUM, MG in the last 48 hours.  CBC: No results for input(s): WBC, HGB, HCT, PLT in the last 48 hours.    Significant Imaging:     Assessment/Plan:      * Community acquired bacterial pneumonia  The patient has a CURB-65 score of 0, and does not meet criteria for healthcare-associated pneumonia.  I have reviewed the chest X-ray and it reveals a right lower lobe infiltrate that given his presentation is suspicious for infection.  Oxygen saturations are stable. Blood cultures are negative. Borderline 02 sats 90% on RA resting on 3/14.  Today looks good.     Sepsis  This patient meets criteria for sepsis given fever, tachycardia, tachypnea, and pneumonia; there is no evidence of end-organ dysfunction.  Resolved.     Essential hypertension  - restarting home med of Valsartan- now well controlled.     Chronic back pain  Stable; there are no acute issues.   Improved.     Tobacco abuse  Patient was counseled on smoking cessation and he will be provided a nicotine transdermal patch applied while inpatient.  Will provide additional smoking cessation counseling prior to discharge.    Hyperlipidemia  Will  continue his home regimen of atorvastatin.    UTI (urinary tract infection)  Not sure if true infection present. Why he had his urine checked? I don't know.  ID has been consulted. No symptoms.        VTE Risk Mitigation         Ordered     enoxaparin injection 40 mg  Daily     Route:  Subcutaneous        03/12/17 2310     Medium Risk of VTE  Once      03/12/17 2310        ID consulted to comment on Enterococcus F in the urine. May not be infection.  Will follow recs.  Otherwise d/c to home later today.     Addendum: 8:27am  Spoke with ID. Asymptomatic bacteruria- would not treat.  Will send home.     Patient likely has undiagnosed COPD causing his hypoxia more than resolving pneumonia.  He should follow up with pulmonary at some point for PFT's.         Garo Adler MD  Department of Hospital Medicine   [facility]

## 2017-03-15 NOTE — ASSESSMENT & PLAN NOTE
Not sure if true infection present. Why he had his urine checked? I don't know.  ID has been consulted. No symptoms.

## 2017-03-15 NOTE — PROGRESS NOTES
LAURA contacted Syeda with [facility] to determine if pt will qualify for oxygen. Per Syeda pt does not have a diagnosis that will qualify pt for oxygen. Per Syeda the only diagnosis pt has is pneumonia and pneumonia will not qualify pt for oxygen.  Dr. Oviedo notified.

## 2017-03-15 NOTE — PLAN OF CARE
Problem: Pneumonia (Adult)  Intervention: Maximize Oxygenation/Ventilation/Perfusion  Patient scheduled to dc on today per Dr. Adler but Patient denied Home O2 per  Yamilet. 02 sats taken on RA while ambulating and at rest and also on 2l. See Notes for results. Patient and wife notified of dc hold and will keep them UTD and follow POC for now.     03/15/17 1503   Positioning   Head of Bed (HOB) HOB at 30 degrees;HOB at 30-45 degrees   Respiratory Interventions   Airway/Ventilation Management airway patency maintained;calming measures promoted;humidification applied;pulmonary hygiene promoted

## 2017-03-15 NOTE — PLAN OF CARE
03/15/17 1047   Medicare Message   Important Message from Medicare regarding Discharge Appeal Rights Given to patient/caregiver;Explained to patient/caregiver;Signed/date by patient/caregiver   Date IMM was signed 03/13/17   Time IMM was signed [id number]

## 2017-03-15 NOTE — DISCHARGE SUMMARY
"[facility] Hospital Medicine  Discharge Summary      Patient Name: Dereck Soria  MRN: [id number]  Admission Date: 3/12/2017  Hospital Length of Stay: 3 days  Discharge Date and Time:  3/17/17  Attending Physician: Garo Oviedo MD   Discharging Provider: Garo Oviedo MD  Primary Care Provider: Dusty Chan MD      HPI:   Mr. Dereck Soria is a 61 y.o. male with essential hypertension, chronic back pain, and tobacco abuse who presents to [facility] ED with complaints of cough for one week.  He reports that the cough has become productive in the last couple days with yellow-green sputum, and today his wife noticed that he was warm to touch.  He says that the cough can get so bad at times that he has some right-sided "rib pain".  He has not been short of breath nor has he had any wheezing.  He denies any nausea, vomiting, anterior chest pain, palpitations, diaphoresis, night sweats, or unexplained weight loss.  There has not been any recent travel but he thinks his grand children has been sick.  He has had both his influenza and pneumococcal vaccinations this year.           * No surgery found *      Indwelling Lines/Drains at time of discharge:   Lines/Drains/Airways          No matching active lines, drains, or airways        Hospital Course:   The patient was admitted to the hospital for treatment of CAP.  The patient was found to have Enterococcus F in his urine and ID was consulted.  ID noted asymptomatic bacteruria and no treatment needed. The patient was found to be hypoxic on RA with 02 sats of 88% at rest. Home 02 was arranged. The patient is a current smoker.  The rest of the hospital course was unremarkable. The patient's blood cultures were negative.  He will be discharged to home today. Activity as tolerated. Diet- low NA. Follow up with PCP in one week.      New Meds:  Percocet  Norvasc 10 daily  Avelox for 7 days      Patient likely has undiagnosed COPD causing " "his hypoxia more than resolving pneumonia.  He should follow up with pulmonary at some point for PFT's.     Consults:     Significant Diagnostic Studies:     Pending Diagnostic Studies:     None        Final Active Diagnoses:    Diagnosis Date Noted POA    PRINCIPAL PROBLEM:  Community acquired bacterial pneumonia [J15.9] 03/12/2017 Yes    Essential hypertension [I10] 03/12/2017 Yes     Chronic    Chronic back pain [M54.9, G89.29] 03/12/2017 Yes     Chronic    Tobacco abuse [Z72.0] 03/12/2017 Yes     Chronic    Hyperlipidemia [E78.5] 03/12/2017 Yes     Chronic      Problems Resolved During this Admission:    Diagnosis Date Noted Date Resolved POA    UTI (urinary tract infection) [N39.0] 03/15/2017 03/15/2017 Yes    Sepsis [A41.9] 03/12/2017 03/15/2017 Yes      No new Assessment & Plan notes have been filed under this hospital service since the last note was generated.  Service: Hospital Medicine      Discharged Condition: good    Disposition: Home or Self Care    Follow Up:  Follow-up Information     Follow up with Dusty Chan MD In 1 week.    Specialty:  General Practice    Contact information:    [address] [place] [address]   [id number]  [place] [id number]  [phone]          Patient Instructions:     OXYGEN FOR HOME USE   Order Specific Question Answer Comments   Liter Flow 2    Duration Continuous    Qualifying SpO2: 88% on RA    Testing done at: Rest    Device home concentrator with portable unit    Height: 5' 6" (1.676 m)    Weight: 66 kg (145 lb 7.4 oz)    Does patient have medical equipment at home? bath bench    Does patient have medical equipment at home? walker, rolling    Alternative treatment measures have been tried or considered and deemed clinically ineffective. Yes      Diet general   Order Specific Question Answer Comments   Na restriction, if any: 2gNa      Activity as tolerated       Medications:  Reconciled Home Medications:   Current Discharge Medication List      START taking these " medications    Details   amlodipine (NORVASC) 10 MG tablet Take 1 tablet (10 mg total) by mouth once daily.  Qty: 30 tablet, Refills: 4      moxifloxacin (AVELOX ABC PACK) 400 mg tablet Take 1 tablet (400 mg total) by mouth once daily.  Qty: 7 tablet, Refills: 0      oxycodone-acetaminophen (PERCOCET) 7.5-325 mg per tablet Take 1 tablet by mouth every 4 (four) hours as needed.  Qty: 30 tablet, Refills: 0         CONTINUE these medications which have NOT CHANGED    Details   acetaminophen (TYLENOL) 500 MG tablet Take 500 mg by mouth every 6 (six) hours as needed.      allopurinol (ZYLOPRIM) 300 MG tablet Take 300 mg by mouth once daily.      amitriptyline (ELAVIL) 75 MG tablet Take 75 mg by mouth every evening.      atorvastatin (LIPITOR) 20 MG tablet Take 20 mg by mouth once daily.      baclofen (LIORESAL) 10 MG tablet Take 10 mg by mouth 4 (four) times daily.      dantrolene (DANTRIUM) 25 MG Cap Take 100 mg by mouth 3 (three) times daily.      ibuprofen (ADVIL,MOTRIN) 200 MG tablet Take 200 mg by mouth every 6 (six) hours as needed.      multivitamin capsule Take 1 capsule by mouth once daily.      tizanidine (ZANAFLEX) 6 mg capsule Take 4 mg by mouth 3 (three) times daily.          STOP taking these medications       hydrocodone-acetaminophen 5-325mg (NORCO) 5-325 mg per tablet Comments:   Reason for Stopping:         oxycodone-acetaminophen (PERCOCET) 5-325 mg per tablet Comments:   Reason for Stopping:         sulfamethoxazole-trimethoprim 400-80mg (BACTRIM,SEPTRA) 400-80 mg per tablet Comments:   Reason for Stopping:             Time spent on the discharge of patient:  < 30  minutes    Garo Adler MD  Department of Hospital Medicine  [facility]

## 2017-03-15 NOTE — PLAN OF CARE
03/15/17 1227   Medicare Message   Important Message from Medicare regarding Discharge Appeal Rights Given to patient/caregiver;Explained to patient/caregiver;Signed/date by patient/caregiver   Date IMM was signed 03/15/17   Time IMM was signed 1211

## 2017-03-15 NOTE — ASSESSMENT & PLAN NOTE
The patient has a CURB-65 score of 0, and does not meet criteria for healthcare-associated pneumonia.  I have reviewed the chest X-ray and it reveals a right lower lobe infiltrate that given his presentation is suspicious for infection.  Oxygen saturations are stable. Blood cultures are negative. Borderline 02 sats 90% on RA resting on 3/14.  Today looks good.

## 2017-03-15 NOTE — PLAN OF CARE
03/15/17 1008   Final Note   Assessment Type Final Discharge Note   Discharge Disposition Home   Discharge planning education complete? Yes   What phone number can be called within the next 1-3 days to see how you are doing after discharge? [phone]   Hospital Follow Up  Appt(s) scheduled? Yes   Discharge plans and expectations educations in teach back method with documentation complete? Yes   Offered [facility] -- Bedside Delivery? n/a   Discharge/Hospital Encounter Summary to ([facility]) PCP No   Referral to Outpatient Case Management complete? No   Referral to / orders for Home Health Complete? No   30 day supply of medicines given at discharge, if documented non-compliance / non-adherence? No   Any social issues identified prior to discharge? No   Did you assess the readiness or willingness of the family or caregiver to support self management of care? Yes     Pt PCP appointment has been scheduled with PCP. Orders received for oxygen. LAURA faxed pt orders to [facility] @ [phone] to determine if pt will qualify for home O2. Awaiting confirmation to determine if services will be provided.     LAURA contacted [facility] @ [phone] to determine a ETA of pt oxygen. According to Yamile they have just received the orders, the pt has been approved and the oxygen will be delivered before 4:00PM. SW provide a copy of follow-up appointment. LAURA explained oxygen will be delivered to the hospital before 4:00PM. LAURA provided pt with a blue folder. LAURA instructed pt to place all medical documents in blue folder. LAURA informed pt to bring medical documents to follow-up appointment. LAURA instructed pt [facility] contact information is listed on follow-up sheets. LAURA explained the 1-800 number and instructed pt to call SW if he has any questions. LAURA informed pt nurse Dev pt can not discharged until oxygen is delivered. LAURA informed nurse pt oxygen should be delivered before 4:00PM. LAURA informed nurse all CM  needs have been met.

## 2017-03-16 LAB
ALLENS TEST: ABNORMAL
BACTERIA UR CULT: NORMAL
DELSYS: ABNORMAL
FIO2: 21
HCO3 UR-SCNC: 27.6 MMOL/L (ref 24–28)
MODE: ABNORMAL
PCO2 BLDA: 39.5 MMHG (ref 35–45)
PH SMN: 7.45 [PH] (ref 7.35–7.45)
PO2 BLDA: 52 MMHG (ref 80–100)
POC BE: 3 MMOL/L
POC SATURATED O2: 88 % (ref 95–100)
POC TCO2: 29 MMOL/L (ref 23–27)
SAMPLE: ABNORMAL
SITE: ABNORMAL
SP02: 85

## 2017-03-16 PROCEDURE — 25000003 PHARM REV CODE 250: Performed by: INTERNAL MEDICINE

## 2017-03-16 PROCEDURE — 11000001 HC ACUTE MED/SURG PRIVATE ROOM

## 2017-03-16 PROCEDURE — 63600175 PHARM REV CODE 636 W HCPCS: Performed by: INTERNAL MEDICINE

## 2017-03-16 RX ADMIN — RAMELTEON 8 MG: 8 TABLET, FILM COATED ORAL at 10:03

## 2017-03-16 RX ADMIN — OXYCODONE HYDROCHLORIDE AND ACETAMINOPHEN 1 TABLET: 7.5; 325 TABLET ORAL at 09:03

## 2017-03-16 RX ADMIN — ENOXAPARIN SODIUM 40 MG: 100 INJECTION SUBCUTANEOUS at 12:03

## 2017-03-16 RX ADMIN — OXYCODONE HYDROCHLORIDE AND ACETAMINOPHEN 1 TABLET: 7.5; 325 TABLET ORAL at 10:03

## 2017-03-16 RX ADMIN — VALSARTAN 320 MG: 80 TABLET, FILM COATED ORAL at 09:03

## 2017-03-16 RX ADMIN — ALLOPURINOL 300 MG: 100 TABLET ORAL at 09:03

## 2017-03-16 RX ADMIN — AMLODIPINE BESYLATE 10 MG: 5 TABLET ORAL at 09:03

## 2017-03-16 RX ADMIN — AZITHROMYCIN MONOHYDRATE 500 MG: 500 INJECTION, POWDER, LYOPHILIZED, FOR SOLUTION INTRAVENOUS at 09:03

## 2017-03-16 RX ADMIN — CEFTRIAXONE 1 G: 1 INJECTION, SOLUTION INTRAVENOUS at 09:03

## 2017-03-16 RX ADMIN — ATORVASTATIN CALCIUM 20 MG: 10 TABLET, FILM COATED ORAL at 09:03

## 2017-03-16 RX ADMIN — NICOTINE 1 PATCH: 21 PATCH, EXTENDED RELEASE TRANSDERMAL at 09:03

## 2017-03-16 NOTE — PLAN OF CARE
Problem: Patient Care Overview  Goal: Plan of Care Review  Outcome: Ongoing (interventions implemented as appropriate)  Patient preparing for dc on today, awaiting Home 02. Will continue POC until dc    03/16/17 1440   Coping/Psychosocial   Plan Of Care Reviewed With patient

## 2017-03-16 NOTE — SUBJECTIVE & OBJECTIVE
Interval History:  No new issues. Resting comfortably.     Review of Systems   Constitutional: Negative for activity change.   Respiratory: Negative for chest tightness and shortness of breath.    Cardiovascular: Negative for chest pain and palpitations.   Gastrointestinal: Negative for abdominal pain.   Genitourinary: Negative for difficulty urinating.   Musculoskeletal: Positive for back pain.     Objective:     Vital Signs (Most Recent):  Temp: 98.4 °F (36.9 °C) (03/16/17 0146)  Pulse: 82 (03/16/17 0146)  Resp: 19 (03/16/17 0146)  BP: 128/75 (03/16/17 0146)  SpO2: (!) 90 % (03/16/17 0146) Vital Signs (24h Range):  Temp:  [98.3 °F (36.8 °C)-98.9 °F (37.2 °C)] 98.4 °F (36.9 °C)  Pulse:  [81-93] 82  Resp:  [18-19] 19  SpO2:  [88 %-96 %] 90 %  BP: (128-141)/(68-76) 128/75     Weight: 66 kg (145 lb 7.4 oz)  Body mass index is 23.48 kg/(m^2).    Intake/Output Summary (Last 24 hours) at 03/16/17 0605  Last data filed at 03/16/17 0500   Gross per 24 hour   Intake             1070 ml   Output             1500 ml   Net             -430 ml      Physical Exam   Constitutional: He is oriented to person, place, and time. He appears well-developed and well-nourished.   Cardiovascular: Normal rate.    Pulmonary/Chest: Effort normal and breath sounds normal.   Abdominal: Soft.   Neurological: He is alert and oriented to person, place, and time.   Skin: Skin is warm and dry.   Vitals reviewed.      Significant Labs: BMP: No results for input(s): GLU, NA, K, CL, CO2, BUN, CREATININE, CALCIUM, MG in the last 48 hours.  CBC: No results for input(s): WBC, HGB, HCT, PLT in the last 48 hours.    Significant Imaging:

## 2017-03-16 NOTE — PROGRESS NOTES
"[facility] Hospital Medicine  Progress Note    Patient Name: Dereck Soria  MRN: [id number]  Patient Class: IP- Inpatient   Admission Date: 3/12/2017  Length of Stay: 4 days  Attending Physician: Garo Oviedo MD  Primary Care Provider: Dusty Chan MD        Subjective:     Principal Problem:Community acquired bacterial pneumonia    HPI:  Mr. Dereck Soria is a 61 y.o. male with essential hypertension, chronic back pain, and tobacco abuse who presents to [facility] ED with complaints of cough for one week.  He reports that the cough has become productive in the last couple days with yellow-green sputum, and today his wife noticed that he was warm to touch.  He says that the cough can get so bad at times that he has some right-sided "rib pain".  He has not been short of breath nor has he had any wheezing.  He denies any nausea, vomiting, anterior chest pain, palpitations, diaphoresis, night sweats, or unexplained weight loss.  There has not been any recent travel but he thinks his grand children has been sick.  He has had both his influenza and pneumococcal vaccinations this year.      Hospital Course:  The patient was admitted to the hospital for treatment of CAP.  The patient was found to have Enterococcus F in his urine and ID was consulted.  ID noted asymptomatic bacteruria and no treatment needed. The patient was found to be hypoxic on RA with 02 sats of 88% at rest. Home 02 was arranged. The patient is a current smoker.  The rest of the hospital course was unremarkable. The patient's blood cultures were negative.  He will be discharged to home today. Activity as tolerated. Diet- low NA. Follow up with PCP in one week.      New Meds:  Percocet  Norvasc 10 daily  Avelox for 7 days       Interval History:  No new issues. Resting comfortably.     Review of Systems   Constitutional: Negative for activity change.   Respiratory: Negative for chest tightness and shortness of breath. "    Cardiovascular: Negative for chest pain and palpitations.   Gastrointestinal: Negative for abdominal pain.   Genitourinary: Negative for difficulty urinating.   Musculoskeletal: Positive for back pain.     Objective:     Vital Signs (Most Recent):  Temp: 98.4 °F (36.9 °C) (03/16/17 0146)  Pulse: 82 (03/16/17 0146)  Resp: 19 (03/16/17 0146)  BP: 128/75 (03/16/17 0146)  SpO2: (!) 90 % (03/16/17 0146) Vital Signs (24h Range):  Temp:  [98.3 °F (36.8 °C)-98.9 °F (37.2 °C)] 98.4 °F (36.9 °C)  Pulse:  [81-93] 82  Resp:  [18-19] 19  SpO2:  [88 %-96 %] 90 %  BP: (128-141)/(68-76) 128/75     Weight: 66 kg (145 lb 7.4 oz)  Body mass index is 23.48 kg/(m^2).    Intake/Output Summary (Last 24 hours) at 03/16/17 0605  Last data filed at 03/16/17 0500   Gross per 24 hour   Intake             1070 ml   Output             1500 ml   Net             -430 ml      Physical Exam   Constitutional: He is oriented to person, place, and time. He appears well-developed and well-nourished.   Cardiovascular: Normal rate.    Pulmonary/Chest: Effort normal and breath sounds normal.   Abdominal: Soft.   Neurological: He is alert and oriented to person, place, and time.   Skin: Skin is warm and dry.   Vitals reviewed.      Significant Labs: BMP: No results for input(s): GLU, NA, K, CL, CO2, BUN, CREATININE, CALCIUM, MG in the last 48 hours.  CBC: No results for input(s): WBC, HGB, HCT, PLT in the last 48 hours.    Significant Imaging:     Assessment/Plan:      * Community acquired bacterial pneumonia  The patient has a CURB-65 score of 0, and does not meet criteria for healthcare-associated pneumonia.  I have reviewed the chest X-ray and it reveals a right lower lobe infiltrate that given his presentation is suspicious for infection.  Oxygen saturations are stable. Blood cultures are negative. Borderline 02 sats 90% on RA resting on 3/14.  Today looks good.  Will hopefully discharge to home.     Essential hypertension  - restarting home med of  Valsartan- now well controlled.     Chronic back pain  Stable; there are no acute issues.   Improved.     Tobacco abuse  Patient was counseled on smoking cessation and he will be provided a nicotine transdermal patch applied while inpatient.  Will provide additional smoking cessation counseling prior to discharge.    Hyperlipidemia  Will continue his home regimen of atorvastatin.    VTE Risk Mitigation         Ordered     enoxaparin injection 40 mg  Daily     Route:  Subcutaneous        03/12/17 2310     Medium Risk of VTE  Once      03/12/17 2310        The patient did not go home on 3/15 due to  not being able to arrange home 02 on a technicality.  The patient is 87% on RA at rest. This is likely from undiagnosed COPD and will not improve sitting in the hospital.  Will discuss with  later this am.  Hopefully home.  He can have follow up with pulmonary with PFT's as out patient.      Garo Adler MD  Department of Hospital Medicine   [facility]

## 2017-03-16 NOTE — PLAN OF CARE
Problem: Pneumonia (Adult)  Goal: Signs and Symptoms of Listed Potential Problems Will be Absent, Minimized or Managed (Pneumonia)  Signs and symptoms of listed potential problems will be absent, minimized or managed by discharge/transition of care (reference Pneumonia (Adult) CPG).   Outcome: Outcome(s) achieved Date Met:  03/16/17 03/16/17 1440   Pneumonia   Problems Assessed (Pneumonia) fluid/electrolyte imbalance;infection progression;respiratory compromise   Problems Present (Pneumonia) fluid/electrolyte imbalance;respiratory compromise

## 2017-03-16 NOTE — ASSESSMENT & PLAN NOTE
The patient has a CURB-65 score of 0, and does not meet criteria for healthcare-associated pneumonia.  I have reviewed the chest X-ray and it reveals a right lower lobe infiltrate that given his presentation is suspicious for infection.  Oxygen saturations are stable. Blood cultures are negative. Borderline 02 sats 90% on RA resting on 3/14.  Today looks good.  Will hopefully discharge to home.

## 2017-03-16 NOTE — PLAN OF CARE
Problem: Patient Care Overview  Goal: Plan of Care Review  Outcome: Ongoing (interventions implemented as appropriate)    03/16/17 0332   Coping/Psychosocial   Plan Of Care Reviewed With patient   Awake, received pain and sleep meds last night. Continues with iv abx as ordered. Ambulates to [place] without assist.    Problem: Fall Risk (Adult)  Goal: Absence of Falls  Patient will demonstrate the desired outcomes by discharge/transition of care.   Outcome: Ongoing (interventions implemented as appropriate)    03/16/17 0332   Fall Risk (Adult)   Absence of Falls making progress toward outcome   Up in room with walker, no assistance required.    Problem: Pneumonia (Adult)  Goal: Signs and Symptoms of Listed Potential Problems Will be Absent, Minimized or Managed (Pneumonia)  Signs and symptoms of listed potential problems will be absent, minimized or managed by discharge/transition of care (reference Pneumonia (Adult) CPG).   Outcome: Ongoing (interventions implemented as appropriate)    03/16/17 0332   Pneumonia   Problems Assessed (Pneumonia) respiratory compromise   Respiratory tx q hours prn, oxygen at 2l/nc, sats 92-94 %.

## 2017-03-17 VITALS
WEIGHT: 145.44 LBS | SYSTOLIC BLOOD PRESSURE: 131 MMHG | TEMPERATURE: 98 F | OXYGEN SATURATION: 96 % | HEIGHT: 66 IN | DIASTOLIC BLOOD PRESSURE: 89 MMHG | BODY MASS INDEX: 23.37 KG/M2 | HEART RATE: 73 BPM | RESPIRATION RATE: 18 BRPM

## 2017-03-17 PROCEDURE — 25000003 PHARM REV CODE 250: Performed by: INTERNAL MEDICINE

## 2017-03-17 PROCEDURE — 63600175 PHARM REV CODE 636 W HCPCS: Performed by: INTERNAL MEDICINE

## 2017-03-17 RX ADMIN — CEFTRIAXONE 1 G: 1 INJECTION, SOLUTION INTRAVENOUS at 09:03

## 2017-03-17 RX ADMIN — AMLODIPINE BESYLATE 10 MG: 5 TABLET ORAL at 09:03

## 2017-03-17 RX ADMIN — NICOTINE 1 PATCH: 21 PATCH, EXTENDED RELEASE TRANSDERMAL at 09:03

## 2017-03-17 RX ADMIN — ATORVASTATIN CALCIUM 20 MG: 10 TABLET, FILM COATED ORAL at 09:03

## 2017-03-17 RX ADMIN — ALLOPURINOL 300 MG: 100 TABLET ORAL at 09:03

## 2017-03-17 RX ADMIN — OXYCODONE HYDROCHLORIDE AND ACETAMINOPHEN 1 TABLET: 7.5; 325 TABLET ORAL at 03:03

## 2017-03-17 RX ADMIN — VALSARTAN 320 MG: 80 TABLET, FILM COATED ORAL at 09:03

## 2017-03-17 RX ADMIN — AZITHROMYCIN MONOHYDRATE 500 MG: 500 INJECTION, POWDER, LYOPHILIZED, FOR SOLUTION INTRAVENOUS at 09:03

## 2017-03-17 NOTE — PROGRESS NOTES
Follow-up Information     Follow up with Dusty Chan MD On 3/20/2017.    Specialty:  General Practice    Why:  Outpatient Services, PCP follow-up appointment. Patient should arrive by 10:15AM.     Contact information:    [address] [place] [address]   [id number]  [place] [id number]  [phone]          Follow up with [facility].    Specialty:  DME Provider    Why:  Oxygen     Contact information:    [address]  [place] [id number] [phone]        PLEASE BRING TO ALL FOLLOW UP APPOINTMENTS:   A COPY YOUR DISCHARGE INSTRUCTIONS, Any new MEDICINES YOU ARE CURRENTLY TAKING IN THEIR ORIGINAL BOTTLES  And IDENTIFICATION AND INSURANCE CARD     **PLEASE ARRIVE 15 MINUTES AHEAD OF SCHEDULED APPOINTMENT TIME   ++PLEASE CALL 24 HOURS IN ADVANCE IF YOU MUST RESCHEDULE YOUR APPOINTMENT DAY AND/OR TIME     Thank you for choosing [facility] for your care. Within 48-72 hours after leaving the hospital you will receive a call from [facility] Nurses following up to see how you are doing. The team will ask you a few questions and the call will last approximately 20 minutes.     Please answer any calls you may receive from [facility] we want to continue to support you as you manage your healthcare needs. [facility] is happy to have the opportunity to serve you.    If you have any questions concerning your symptoms:     [facility] On Call Nurse Care Line - 24/7 Assistance  Registered [facility] nurses can provide appointment booking, health education, clinical advisement, and other advisory services.   Call for this free service at [phone].      Again, Thank you for allowing me to help with your discharge planning and choosing [facility] as your healthcare provider.     Yamilet Vallecillo, BS, MSW, CSW  [phone]  Care Management

## 2017-03-17 NOTE — PROGRESS NOTES
"[facility] Hospital Medicine  Progress Note    Patient Name: Dereck Soria  MRN: [id number]  Patient Class: IP- Inpatient   Admission Date: 3/12/2017  Length of Stay: 5 days  Attending Physician: Garo Oviedo MD  Primary Care Provider: Dusty Chan MD        Subjective:     Principal Problem:Community acquired bacterial pneumonia    HPI:  Mr. Dereck Soria is a 61 y.o. male with essential hypertension, chronic back pain, and tobacco abuse who presents to [facility] ED with complaints of cough for one week.  He reports that the cough has become productive in the last couple days with yellow-green sputum, and today his wife noticed that he was warm to touch.  He says that the cough can get so bad at times that he has some right-sided "rib pain".  He has not been short of breath nor has he had any wheezing.  He denies any nausea, vomiting, anterior chest pain, palpitations, diaphoresis, night sweats, or unexplained weight loss.  There has not been any recent travel but he thinks his grand children has been sick.  He has had both his influenza and pneumococcal vaccinations this year.      Hospital Course:  The patient was admitted to the hospital for treatment of CAP.  The patient was found to have Enterococcus F in his urine and ID was consulted.  ID noted asymptomatic bacteruria and no treatment needed. The patient was found to be hypoxic on RA with 02 sats of 88% at rest. Home 02 was arranged. The patient is a current smoker.  The rest of the hospital course was unremarkable. The patient's blood cultures were negative.  He will be discharged to home today. Activity as tolerated. Diet- low NA. Follow up with PCP in one week.      New Meds:  Percocet  Norvasc 10 daily  Avelox for 7 days       Interval History: No new issues.      Review of Systems   Constitutional: Negative for activity change.   Respiratory: Negative for chest tightness and shortness of breath.    Cardiovascular: " Negative for chest pain and palpitations.   Gastrointestinal: Negative for abdominal pain.   Genitourinary: Negative for difficulty urinating.   Musculoskeletal: Positive for back pain.     Objective:     Vital Signs (Most Recent):  Temp: 98.6 °F (37 °C) (03/17/17 0043)  Pulse: 88 (03/17/17 0043)  Resp: 18 (03/17/17 0043)  BP: (!) 143/69 (03/17/17 0043)  SpO2: 96 % (03/17/17 0043) Vital Signs (24h Range):  Temp:  [98.4 °F (36.9 °C)-98.8 °F (37.1 °C)] 98.6 °F (37 °C)  Pulse:  [85-90] 88  Resp:  [18-20] 18  SpO2:  [94 %-96 %] 96 %  BP: (123-157)/(68-75) 143/69     Weight: 66 kg (145 lb 7.4 oz)  Body mass index is 23.48 kg/(m^2).    Intake/Output Summary (Last 24 hours) at 03/17/17 0857  Last data filed at 03/17/17 0500   Gross per 24 hour   Intake             1620 ml   Output             2125 ml   Net             -505 ml      Physical Exam   Constitutional: He is oriented to person, place, and time. He appears well-developed and well-nourished.   Cardiovascular: Normal rate.    Pulmonary/Chest: Effort normal and breath sounds normal.   Abdominal: Soft.   Neurological: He is alert and oriented to person, place, and time.   Skin: Skin is warm and dry.   Vitals reviewed.      Significant Labs: BMP: No results for input(s): GLU, NA, K, CL, CO2, BUN, CREATININE, CALCIUM, MG in the last 48 hours.  CBC: No results for input(s): WBC, HGB, HCT, PLT in the last 48 hours.    Significant Imaging:     Assessment/Plan:      * Community acquired bacterial pneumonia  The patient has a CURB-65 score of 0, and does not meet criteria for healthcare-associated pneumonia.  I have reviewed the chest X-ray and it reveals a right lower lobe infiltrate that given his presentation is suspicious for infection.  Oxygen saturations are stable. Blood cultures are negative. Borderline 02 sats 90% on RA resting on 3/14.  Today looks good.  Will hopefully discharge to home.     Essential hypertension  - restarting home med of Valsartan- now well  controlled.     Chronic back pain  Stable; there are no acute issues.   Improved.     Tobacco abuse  Patient was counseled on smoking cessation and he will be provided a nicotine transdermal patch applied while inpatient.  Will provide additional smoking cessation counseling prior to discharge.    Hyperlipidemia  Will continue his home regimen of atorvastatin.    VTE Risk Mitigation         Ordered     enoxaparin injection 40 mg  Daily     Route:  Subcutaneous        03/12/17 2310     Medium Risk of VTE  Once      03/12/17 2310        Will hopefully discharge to home today.     ABG reviewed. Patient has a P02 of 52 on RA. This level of hypoxia is likely due to resolving pneumonia with underlying lung disease from tobacco abuse- or just underlying COPD by itself.     Garo Adler MD  Department of Hospital Medicine   [facility]

## 2017-03-17 NOTE — PLAN OF CARE
Problem: Patient Care Overview  Goal: Plan of Care Review  Outcome: Ongoing (interventions implemented as appropriate)  Pt currently

## 2017-03-17 NOTE — SUBJECTIVE & OBJECTIVE
Interval History: No new issues.      Review of Systems   Constitutional: Negative for activity change.   Respiratory: Negative for chest tightness and shortness of breath.    Cardiovascular: Negative for chest pain and palpitations.   Gastrointestinal: Negative for abdominal pain.   Genitourinary: Negative for difficulty urinating.   Musculoskeletal: Positive for back pain.     Objective:     Vital Signs (Most Recent):  Temp: 98.6 °F (37 °C) (03/17/17 0043)  Pulse: 88 (03/17/17 0043)  Resp: 18 (03/17/17 0043)  BP: (!) 143/69 (03/17/17 0043)  SpO2: 96 % (03/17/17 0043) Vital Signs (24h Range):  Temp:  [98.4 °F (36.9 °C)-98.8 °F (37.1 °C)] 98.6 °F (37 °C)  Pulse:  [85-90] 88  Resp:  [18-20] 18  SpO2:  [94 %-96 %] 96 %  BP: (123-157)/(68-75) 143/69     Weight: 66 kg (145 lb 7.4 oz)  Body mass index is 23.48 kg/(m^2).    Intake/Output Summary (Last 24 hours) at 03/17/17 0857  Last data filed at 03/17/17 0500   Gross per 24 hour   Intake             1620 ml   Output             2125 ml   Net             -505 ml      Physical Exam   Constitutional: He is oriented to person, place, and time. He appears well-developed and well-nourished.   Cardiovascular: Normal rate.    Pulmonary/Chest: Effort normal and breath sounds normal.   Abdominal: Soft.   Neurological: He is alert and oriented to person, place, and time.   Skin: Skin is warm and dry.   Vitals reviewed.      Significant Labs: BMP: No results for input(s): GLU, NA, K, CL, CO2, BUN, CREATININE, CALCIUM, MG in the last 48 hours.  CBC: No results for input(s): WBC, HGB, HCT, PLT in the last 48 hours.    Significant Imaging:

## 2017-03-17 NOTE — PLAN OF CARE
Problem: Patient Care Overview  Goal: Plan of Care Review  Outcome: Ongoing (interventions implemented as appropriate)  Patient remains free of falls with rolling walker by bed. No acute distress noted. Sats 96-98% on 2L 02. Patient awaiting d/c with home 02 orders. Dr. Adler and Case  to meet on issue this a.m. Will continue to monitor patient's status and discharge when arrangements complete per Dr. Adler    03/17/17 1219   Coping/Psychosocial   Plan Of Care Reviewed With patient

## 2017-03-17 NOTE — PLAN OF CARE
Problem: Patient Care Overview  Goal: Plan of Care Review  Outcome: Ongoing (interventions implemented as appropriate)  Pt currently AAOx4, no falls no injuries.  Pt moves independently in bed.  Pain of 6/10 to back controlled by PRN medication.  Frequent cough, breath sounds are clear and equal, slight wheeze on expiration.  SpO2 WNL on 2L NC.  Will continue to monitor.

## 2017-03-17 NOTE — PLAN OF CARE
Problem: Pneumonia (Adult)  Intervention: Maximize Oxygenation/Ventilation/Perfusion    03/17/17 1215   Positioning   Head of Bed (HOB) HOB at 30-45 degrees   Respiratory Interventions   Airway/Ventilation Management airway patency maintained;calming measures promoted;pulmonary hygiene promoted

## 2017-03-18 LAB
BACTERIA BLD CULT: NORMAL
BACTERIA BLD CULT: NORMAL

## 2017-03-21 ENCOUNTER — NURSE TRIAGE (OUTPATIENT)
Dept: ADMINISTRATIVE | Facility: CLINIC | Age: 62
End: 2017-03-21

## 2017-03-21 ENCOUNTER — PATIENT OUTREACH (OUTPATIENT)
Dept: ADMINISTRATIVE | Facility: CLINIC | Age: 62
End: 2017-03-21
Payer: MEDICARE

## 2017-03-21 RX ORDER — AMITRIPTYLINE HYDROCHLORIDE 50 MG/1
50 TABLET, FILM COATED ORAL NIGHTLY
COMMUNITY

## 2017-03-21 NOTE — PATIENT INSTRUCTIONS
Discharge Instructions for Pneumonia  You have been diagnosed with pneumonia, a serious lung infection. Most cases of pneumonia are caused by bacteria. Pneumonia most often occurs in older adults, young children, and people with chronic health problems.  Home Care  Take your medication exactly as directed. Dont skip doses. Continue taking your antibiotics as directed until they are all gone--even if you start to feel better. This will prevent the pneumonia from coming back.  Drink at least 8 glasses of water daily, unless directed otherwise. This helps to loosen and thin secretions so that you can cough them up.  Use a cool-mist humidifier in your bedroom. Be sure to clean the humidifier daily.  Coughing up mucus is normal. Dont use medications to suppress your cough unless your cough is dry, painful, or interferes with your sleep. You may use an expectorant if ordered by your doctor.  Warm compresses or a moist heating pad on the lowest setting can be used to relieve chest discomfort. Use several times a day for 15-20 minutes at a time. (To prevent injuring your skin, be sure the temperature of the compress or heating pad is warm, not hot.)  Get plenty of rest until your fever, shortness of breath, and chest pain go away.  Plan to get a flu shot every year.  Ask your doctor about a pneumonia vaccination.  Follow-Up  Make a follow-up appointment as directed by our staff.    When to Seek Medical Attention  Call 911 right away if you have any of the following:  Chest pain  Trouble breathing  Blue lips or fingernails  Otherwise, call your doctor if you have any of the following:  Fever above 100.4°F  Yellow, green, bloody, or smelly sputum  More than normal mucus production  Vomiting   © 3637-3774 [employer], [address] [place] [address], [place] [address]. All rights reserved. This information is not intended as a substitute for professional medical care. Always follow your healthcare professional's instructions.

## 2017-03-21 NOTE — TELEPHONE ENCOUNTER
"  Reason for Disposition   Major surgery in the past month     Hernia operation in feb 2017    Protocols used: ST LOW BLOOD PRESSURE-A-OH  Pt c/o feeling weak; Pt's bp is 114/59 p 84. Pt states, I am no longer taking any bp medication. Denies cp or sob. No dizziness-reporting weakness "in my head."  Per protocol, recommended he go to ED based on info given. Verbalized understanding.  Pt states he will call pcp if s/s worsen. Pt aware of OOC#.  "

## 2017-03-21 NOTE — PROGRESS NOTES
C3 nurse attempted to contact patient. No answer. The following message was left for the patient to return the call:  Good morning  I am a nurse calling on behalf of [facility] from the [facility].  This is a Transitional Care Call for Dereck Soria . When you have a moment please contact us at [phone] or [phone] Monday through Friday, between the hours of 8 am to 4 pm. We look forward to speaking with you. On behalf of [facility] have a nice day.    The patient has a scheduled HOSFU appointment with Dusty Chan MD  on 3/21 @ 1015. Message sent to Physician staff.[facility].

## 2017-11-20 ENCOUNTER — HOSPITAL ENCOUNTER (EMERGENCY)
Facility: OTHER | Age: 62
Discharge: HOME | End: 2017-11-20
Payer: MEDICARE

## 2017-11-20 VITALS
OXYGEN SATURATION: 97 % | SYSTOLIC BLOOD PRESSURE: 157 MMHG | RESPIRATION RATE: 18 BRPM | HEIGHT: 66 IN | TEMPERATURE: 97 F | HEART RATE: 104 BPM | BODY MASS INDEX: 23.3 KG/M2 | WEIGHT: 145 LBS | DIASTOLIC BLOOD PRESSURE: 94 MMHG

## 2017-11-20 DIAGNOSIS — T23.321A: Primary | ICD-10-CM

## 2017-11-20 PROCEDURE — 99283 EMERGENCY DEPT VISIT LOW MDM: CPT

## 2017-11-20 RX ORDER — MELOXICAM 15 MG/1
15 TABLET ORAL DAILY
COMMUNITY

## 2017-11-20 RX ORDER — SULFAMETHOXAZOLE AND TRIMETHOPRIM 800; 160 MG/1; MG/1
1 TABLET ORAL EVERY 12 HOURS
Qty: 14 TABLET | Refills: 0 | Status: SHIPPED | OUTPATIENT
Start: 2017-11-20 | End: 2017-11-25

## 2017-11-20 RX ORDER — ERYTHROMYCIN 20 MG/ML
SOLUTION TOPICAL 2 TIMES DAILY
Qty: 60 ML | Refills: 0 | Status: SHIPPED | OUTPATIENT
Start: 2017-11-20 | End: 2017-11-30

## 2017-11-20 RX ORDER — SILVER SULFADIAZINE 10 G/1000G
CREAM TOPICAL 2 TIMES DAILY
Qty: 20 G | Refills: 0 | Status: SHIPPED | OUTPATIENT
Start: 2017-11-20 | End: 2017-11-30

## 2017-11-20 RX ORDER — TOLTERODINE 4 MG/1
4 CAPSULE, EXTENDED RELEASE ORAL DAILY
COMMUNITY

## 2017-11-20 RX ORDER — ALFUZOSIN HYDROCHLORIDE 10 MG/1
10 TABLET, EXTENDED RELEASE ORAL
COMMUNITY
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
## 2018-08-04 ENCOUNTER — OFFICE VISIT (OUTPATIENT)
Dept: URGENT CARE | Facility: CLINIC | Age: 63
End: 2018-08-04
Payer: MEDICARE

## 2018-08-04 VITALS
TEMPERATURE: 98 F | RESPIRATION RATE: 18 BRPM | WEIGHT: 145 LBS | HEIGHT: 66 IN | OXYGEN SATURATION: 98 % | DIASTOLIC BLOOD PRESSURE: 80 MMHG | SYSTOLIC BLOOD PRESSURE: 135 MMHG | BODY MASS INDEX: 23.3 KG/M2 | HEART RATE: 104 BPM

## 2018-08-04 DIAGNOSIS — L02.511 ABSCESS OF FINGER OF RIGHT HAND: Primary | ICD-10-CM

## 2018-08-04 PROCEDURE — 26010 DRAINAGE OF FINGER ABSCESS: CPT | Mod: RT,S$GLB,, | Performed by: NURSE PRACTITIONER

## 2018-08-04 PROCEDURE — 99203 OFFICE O/P NEW LOW 30 MIN: CPT | Mod: 25,S$GLB,, | Performed by: NURSE PRACTITIONER

## 2018-08-04 RX ORDER — MUPIROCIN 20 MG/G
OINTMENT TOPICAL
Qty: 22 G | Refills: 0 | Status: SHIPPED | OUTPATIENT
Start: 2018-08-04

## 2018-08-04 RX ORDER — AMLODIPINE BESYLATE 10 MG/1
TABLET ORAL
COMMUNITY

## 2018-08-04 RX ORDER — ALLOPURINOL 300 MG/1
TABLET ORAL
COMMUNITY

## 2018-08-04 RX ORDER — ALBUTEROL SULFATE 90 UG/1
AEROSOL, METERED RESPIRATORY (INHALATION)
COMMUNITY

## 2018-08-04 RX ORDER — CLINDAMYCIN HYDROCHLORIDE 300 MG/1
300 CAPSULE ORAL 4 TIMES DAILY
Qty: 40 CAPSULE | Refills: 0 | Status: SHIPPED | OUTPATIENT
Start: 2018-08-04 | End: 2018-08-14

## 2018-08-04 RX ORDER — ALFUZOSIN HYDROCHLORIDE 10 MG/1
TABLET, EXTENDED RELEASE ORAL
COMMUNITY

## 2018-08-04 RX ORDER — AMITRIPTYLINE HYDROCHLORIDE 75 MG/1
TABLET ORAL
COMMUNITY

## 2018-08-04 RX ORDER — TRAZODONE HYDROCHLORIDE 50 MG/1
TABLET ORAL
COMMUNITY

## 2018-08-04 RX ORDER — AMITRIPTYLINE HYDROCHLORIDE 50 MG/1
TABLET, FILM COATED ORAL
COMMUNITY

## 2018-08-04 NOTE — PATIENT INSTRUCTIONS
Abscess (Incision & Drainage)  An abscess is sometimes called a boil. It happens when bacteria get trapped under the skin and start to grow. Pus forms inside the abscess as the body responds to the bacteria. An abscess can happen with an insect bite, ingrown hair, blocked oil gland, pimple, cyst, or puncture wound.  Your healthcare provider has drained the pus from your abscess. If the abscess pocket was large, your healthcare provider may have put in gauze packing. Your provider will need to remove it on your next visit. He or she may also replace it at that time. You may not need antibiotics to treat a simple abscess, unless the infection is spreading into the skin around the wound (cellulitis).  The wound will take about 1 to 2 weeks to heal, depending on the size of the abscess. Healthy tissue will grow from the bottom and sides of the opening until it seals over.  Home care  These tips can help your wound heal:  · The wound may drain for the first 2 days. Cover the wound with a clean dry dressing. Change the dressing if it becomes soaked with blood or pus.  · If a gauze packing was placed inside the abscess pocket, you may be told to remove it yourself. You may do this in the shower. Once the packing is removed, you should wash the area in the shower, or clean the area as directed by your provider. Continue to do this until the skin opening has closed. Make sure you wash your hands after changing the packing or cleaning the wound.  · If you were prescribed antibiotics, take them as directed until they are all gone.  · You may use acetaminophen or ibuprofen to control pain, unless another pain medicine was prescribed. If you have liver disease or ever had a stomach ulcer, talk with your doctor before using these medicines.  Follow-up care  Follow up with your healthcare provider, or as advised. If a gauze packing was put in your wound, it should be removed in 1 to 2 days. Check your wound every day for any  signs that the infection is getting worse. The signs are listed below.  When to seek medical advice  Call your healthcare provider right away if any of these occur:  · Increasing redness or swelling  · Red streaks in the skin leading away from the wound  · Increasing local pain or swelling  · Continued pus draining from the wound 2 days after treatment  · Fever of 100.4ºF (38ºC) or higher, or as directed by your healthcare provider  · Boil returns when you are at home  Date Last Reviewed: 9/1/2016  © 4133-7714 [employer]. [address] [place] [address]. All rights reserved. This information is not intended as a substitute for professional medical care. Always follow your healthcare professional's instructions.      -Antibiotics for 10 days.  -Bactroban to the affected area 2 times a day for the next 5-7 days.  -Warm salt water soaks.  -Keep the area clean and covered.  Please follow up with your Primary care provider within 2-5 days if your signs and symptoms have not resolved or worsen.     If your condition worsens or fails to improve we recommend that you receive another evaluation at the emergency room immediately or contact your primary medical clinic to discuss your concerns.   You must understand that you have received an Urgent Care treatment only and that you may be released before all of your medical problems are known or treated. You, the patient, will arrange for follow up care as instructed.
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
## 2021-06-04 NOTE — SUBJECTIVE & OBJECTIVE
Interval History: No new issues.     Review of Systems   Constitutional: Negative for activity change.   Respiratory: Negative for chest tightness and shortness of breath.    Cardiovascular: Negative for chest pain and palpitations.   Gastrointestinal: Negative for abdominal pain.   Genitourinary: Negative for difficulty urinating.   Musculoskeletal: Positive for back pain.     Objective:     Vital Signs (Most Recent):  Temp: 99.3 °F (37.4 °C) (03/14/17 2355)  Pulse: 99 (03/14/17 2355)  Resp: 18 (03/14/17 2355)  BP: 134/78 (03/14/17 2355)  SpO2: 98 % (03/14/17 2355) Vital Signs (24h Range):  Temp:  [98.7 °F (37.1 °C)-100.5 °F (38.1 °C)] 99.3 °F (37.4 °C)  Pulse:  [] 99  Resp:  [18-20] 18  SpO2:  [85 %-98 %] 98 %  BP: (113-146)/(67-78) 134/78     Weight: 66 kg (145 lb 7.4 oz)  Body mass index is 23.48 kg/(m^2).    Intake/Output Summary (Last 24 hours) at 03/15/17 0818  Last data filed at 03/15/17 0400   Gross per 24 hour   Intake             1090 ml   Output             1925 ml   Net             -835 ml      Physical Exam   Constitutional: He is oriented to person, place, and time. He appears well-developed and well-nourished.   Cardiovascular: Normal rate.    Pulmonary/Chest: Effort normal and breath sounds normal.   Abdominal: Soft.   Neurological: He is alert and oriented to person, place, and time.   Skin: Skin is warm and dry.   Vitals reviewed.      Significant Labs: BMP: No results for input(s): GLU, NA, K, CL, CO2, BUN, CREATININE, CALCIUM, MG in the last 48 hours.  CBC: No results for input(s): WBC, HGB, HCT, PLT in the last 48 hours.    Significant Imaging:    Preventative care:    Orders placed in advance for labs. Please return fasting in 1 week for these.    Hypertension:     Continue current dose of hydrochlorothiazide.    Allergies:     Start Flonase nasal spray, 2 sprays in each nostril daily.     Start Zyrtec 10 mg daily.    Cut back on nasal spray use if you have recurrent nosebleeds.    ADHA:     Continue current dose of Adderall.    Depression:     Continue to monitor. Follow up as needed for worsening symptoms.    Weight gain:    Work on healthy diet and regular exercise.    Follow up:    Return to see Dr. Lanza in 1 year for next annual physical.